# Patient Record
Sex: FEMALE | Race: WHITE | URBAN - METROPOLITAN AREA
[De-identification: names, ages, dates, MRNs, and addresses within clinical notes are randomized per-mention and may not be internally consistent; named-entity substitution may affect disease eponyms.]

---

## 2018-04-04 ENCOUNTER — OFFICE VISIT (OUTPATIENT)
Dept: PRIMARY CARE CLINIC | Age: 33
End: 2018-04-04

## 2018-04-04 VITALS
RESPIRATION RATE: 20 BRPM | HEART RATE: 96 BPM | OXYGEN SATURATION: 98 % | WEIGHT: 176.2 LBS | SYSTOLIC BLOOD PRESSURE: 118 MMHG | DIASTOLIC BLOOD PRESSURE: 66 MMHG | HEIGHT: 62 IN | TEMPERATURE: 98.3 F | BODY MASS INDEX: 32.42 KG/M2

## 2018-04-04 DIAGNOSIS — F11.10 HEROIN ABUSE (HCC): ICD-10-CM

## 2018-04-04 DIAGNOSIS — Z3A.20 20 WEEKS GESTATION OF PREGNANCY: Primary | ICD-10-CM

## 2018-04-04 PROCEDURE — 99203 OFFICE O/P NEW LOW 30 MIN: CPT | Performed by: NURSE PRACTITIONER

## 2018-04-04 ASSESSMENT — PATIENT HEALTH QUESTIONNAIRE - PHQ9
SUM OF ALL RESPONSES TO PHQ9 QUESTIONS 1 & 2: 0
1. LITTLE INTEREST OR PLEASURE IN DOING THINGS: 0
SUM OF ALL RESPONSES TO PHQ QUESTIONS 1-9: 0
2. FEELING DOWN, DEPRESSED OR HOPELESS: 0

## 2018-04-04 ASSESSMENT — ENCOUNTER SYMPTOMS
EYES NEGATIVE: 1
GASTROINTESTINAL NEGATIVE: 1

## 2023-02-06 ENCOUNTER — APPOINTMENT (OUTPATIENT)
Dept: CT IMAGING | Age: 38
DRG: 113 | End: 2023-02-06
Payer: COMMERCIAL

## 2023-02-06 ENCOUNTER — HOSPITAL ENCOUNTER (INPATIENT)
Age: 38
LOS: 1 days | Discharge: HOME | DRG: 113 | End: 2023-02-07
Attending: STUDENT IN AN ORGANIZED HEALTH CARE EDUCATION/TRAINING PROGRAM | Admitting: INTERNAL MEDICINE
Payer: COMMERCIAL

## 2023-02-06 ENCOUNTER — APPOINTMENT (OUTPATIENT)
Dept: GENERAL RADIOLOGY | Age: 38
DRG: 113 | End: 2023-02-06
Payer: COMMERCIAL

## 2023-02-06 DIAGNOSIS — J02.0 STREP PHARYNGITIS: Primary | ICD-10-CM

## 2023-02-06 DIAGNOSIS — J44.1 COPD EXACERBATION (HCC): ICD-10-CM

## 2023-02-06 DIAGNOSIS — J36 TONSILLAR ABSCESS: ICD-10-CM

## 2023-02-06 DIAGNOSIS — F17.200 SMOKER: ICD-10-CM

## 2023-02-06 PROBLEM — A41.9 SEPSIS (HCC): Status: ACTIVE | Noted: 2023-02-06

## 2023-02-06 LAB
A/G RATIO: 0.9 (ref 1.1–2.2)
ALBUMIN SERPL-MCNC: 3.9 G/DL (ref 3.4–5)
ALP BLD-CCNC: 91 U/L (ref 40–129)
ALT SERPL-CCNC: 23 U/L (ref 10–40)
ANION GAP SERPL CALCULATED.3IONS-SCNC: 11 MMOL/L (ref 3–16)
AST SERPL-CCNC: 11 U/L (ref 15–37)
BASE EXCESS VENOUS: 3.6 MMOL/L (ref -3–3)
BASOPHILS ABSOLUTE: 0.1 K/UL (ref 0–0.2)
BASOPHILS RELATIVE PERCENT: 0.8 %
BILIRUB SERPL-MCNC: 0.4 MG/DL (ref 0–1)
BUN BLDV-MCNC: 8 MG/DL (ref 7–20)
CALCIUM SERPL-MCNC: 9.3 MG/DL (ref 8.3–10.6)
CHLORIDE BLD-SCNC: 100 MMOL/L (ref 99–110)
CO2: 28 MMOL/L (ref 21–32)
CREAT SERPL-MCNC: 0.7 MG/DL (ref 0.6–1.1)
EOSINOPHILS ABSOLUTE: 0.2 K/UL (ref 0–0.6)
EOSINOPHILS RELATIVE PERCENT: 1.5 %
GFR SERPL CREATININE-BSD FRML MDRD: >60 ML/MIN/{1.73_M2}
GLUCOSE BLD-MCNC: 96 MG/DL (ref 70–99)
HCG QUALITATIVE: NEGATIVE
HCO3 VENOUS: 28.2 MMOL/L (ref 23–29)
HCT VFR BLD CALC: 41.7 % (ref 36–48)
HEMOGLOBIN: 13.9 G/DL (ref 12–16)
LACTIC ACID, SEPSIS: 1.1 MMOL/L (ref 0.4–1.9)
LYMPHOCYTES ABSOLUTE: 2.4 K/UL (ref 1–5.1)
LYMPHOCYTES RELATIVE PERCENT: 16.7 %
MCH RBC QN AUTO: 26.3 PG (ref 26–34)
MCHC RBC AUTO-ENTMCNC: 33.2 G/DL (ref 31–36)
MCV RBC AUTO: 79.1 FL (ref 80–100)
MONOCYTES ABSOLUTE: 1.1 K/UL (ref 0–1.3)
MONOCYTES RELATIVE PERCENT: 7.6 %
NEUTROPHILS ABSOLUTE: 10.7 K/UL (ref 1.7–7.7)
NEUTROPHILS RELATIVE PERCENT: 73.4 %
O2 SAT, VEN: 87 %
O2 THERAPY: ABNORMAL
PCO2, VEN: 44.3 MMHG (ref 40–50)
PDW BLD-RTO: 14.9 % (ref 12.4–15.4)
PH VENOUS: 7.41 (ref 7.35–7.45)
PLATELET # BLD: 297 K/UL (ref 135–450)
PMV BLD AUTO: 8.8 FL (ref 5–10.5)
PO2, VEN: 52.6 MMHG (ref 25–40)
POTASSIUM REFLEX MAGNESIUM: 4.1 MMOL/L (ref 3.5–5.1)
RAPID INFLUENZA  B AGN: NEGATIVE
RAPID INFLUENZA A AGN: NEGATIVE
RBC # BLD: 5.27 M/UL (ref 4–5.2)
S PYO AG THROAT QL: POSITIVE
SARS-COV-2, NAAT: NOT DETECTED
SODIUM BLD-SCNC: 139 MMOL/L (ref 136–145)
TCO2 CALC VENOUS: 30 MMOL/L
TOTAL PROTEIN: 8.1 G/DL (ref 6.4–8.2)
WBC # BLD: 14.6 K/UL (ref 4–11)

## 2023-02-06 PROCEDURE — 82803 BLOOD GASES ANY COMBINATION: CPT

## 2023-02-06 PROCEDURE — 1200000000 HC SEMI PRIVATE

## 2023-02-06 PROCEDURE — 87040 BLOOD CULTURE FOR BACTERIA: CPT

## 2023-02-06 PROCEDURE — 85025 COMPLETE CBC W/AUTO DIFF WBC: CPT

## 2023-02-06 PROCEDURE — 70491 CT SOFT TISSUE NECK W/DYE: CPT

## 2023-02-06 PROCEDURE — 6360000002 HC RX W HCPCS

## 2023-02-06 PROCEDURE — 96365 THER/PROPH/DIAG IV INF INIT: CPT

## 2023-02-06 PROCEDURE — 71046 X-RAY EXAM CHEST 2 VIEWS: CPT

## 2023-02-06 PROCEDURE — 6370000000 HC RX 637 (ALT 250 FOR IP): Performed by: NURSE PRACTITIONER

## 2023-02-06 PROCEDURE — 6370000000 HC RX 637 (ALT 250 FOR IP)

## 2023-02-06 PROCEDURE — 96361 HYDRATE IV INFUSION ADD-ON: CPT

## 2023-02-06 PROCEDURE — G0378 HOSPITAL OBSERVATION PER HR: HCPCS

## 2023-02-06 PROCEDURE — 87880 STREP A ASSAY W/OPTIC: CPT

## 2023-02-06 PROCEDURE — 83605 ASSAY OF LACTIC ACID: CPT

## 2023-02-06 PROCEDURE — 87635 SARS-COV-2 COVID-19 AMP PRB: CPT

## 2023-02-06 PROCEDURE — 2580000003 HC RX 258: Performed by: STUDENT IN AN ORGANIZED HEALTH CARE EDUCATION/TRAINING PROGRAM

## 2023-02-06 PROCEDURE — 99285 EMERGENCY DEPT VISIT HI MDM: CPT

## 2023-02-06 PROCEDURE — 87150 DNA/RNA AMPLIFIED PROBE: CPT

## 2023-02-06 PROCEDURE — 6360000004 HC RX CONTRAST MEDICATION: Performed by: STUDENT IN AN ORGANIZED HEALTH CARE EDUCATION/TRAINING PROGRAM

## 2023-02-06 PROCEDURE — 80053 COMPREHEN METABOLIC PANEL: CPT

## 2023-02-06 PROCEDURE — 96375 TX/PRO/DX INJ NEW DRUG ADDON: CPT

## 2023-02-06 PROCEDURE — 36415 COLL VENOUS BLD VENIPUNCTURE: CPT

## 2023-02-06 PROCEDURE — 84703 CHORIONIC GONADOTROPIN ASSAY: CPT

## 2023-02-06 PROCEDURE — 87804 INFLUENZA ASSAY W/OPTIC: CPT

## 2023-02-06 PROCEDURE — 2580000003 HC RX 258

## 2023-02-06 RX ORDER — ACETAMINOPHEN 500 MG
1000 TABLET ORAL ONCE
Status: COMPLETED | OUTPATIENT
Start: 2023-02-06 | End: 2023-02-06

## 2023-02-06 RX ORDER — IPRATROPIUM BROMIDE AND ALBUTEROL SULFATE 2.5; .5 MG/3ML; MG/3ML
1 SOLUTION RESPIRATORY (INHALATION) EVERY 4 HOURS PRN
Status: DISCONTINUED | OUTPATIENT
Start: 2023-02-06 | End: 2023-02-07 | Stop reason: HOSPADM

## 2023-02-06 RX ORDER — PREDNISONE 20 MG/1
60 TABLET ORAL ONCE
Status: COMPLETED | OUTPATIENT
Start: 2023-02-06 | End: 2023-02-06

## 2023-02-06 RX ORDER — IPRATROPIUM BROMIDE AND ALBUTEROL SULFATE 2.5; .5 MG/3ML; MG/3ML
1 SOLUTION RESPIRATORY (INHALATION) ONCE
Status: COMPLETED | OUTPATIENT
Start: 2023-02-06 | End: 2023-02-06

## 2023-02-06 RX ORDER — METHADONE HYDROCHLORIDE 10 MG/5ML
98 SOLUTION ORAL DAILY
COMMUNITY

## 2023-02-06 RX ORDER — DEXAMETHASONE SODIUM PHOSPHATE 4 MG/ML
10 INJECTION, SOLUTION INTRA-ARTICULAR; INTRALESIONAL; INTRAMUSCULAR; INTRAVENOUS; SOFT TISSUE ONCE
Status: COMPLETED | OUTPATIENT
Start: 2023-02-06 | End: 2023-02-06

## 2023-02-06 RX ORDER — SODIUM CHLORIDE 9 MG/ML
INJECTION, SOLUTION INTRAVENOUS PRN
Status: DISCONTINUED | OUTPATIENT
Start: 2023-02-06 | End: 2023-02-07 | Stop reason: HOSPADM

## 2023-02-06 RX ORDER — SODIUM CHLORIDE 0.9 % (FLUSH) 0.9 %
5-40 SYRINGE (ML) INJECTION PRN
Status: DISCONTINUED | OUTPATIENT
Start: 2023-02-06 | End: 2023-02-07 | Stop reason: HOSPADM

## 2023-02-06 RX ORDER — ALBUTEROL SULFATE 2.5 MG/3ML
5 SOLUTION RESPIRATORY (INHALATION) ONCE
Status: COMPLETED | OUTPATIENT
Start: 2023-02-06 | End: 2023-02-06

## 2023-02-06 RX ORDER — ONDANSETRON 2 MG/ML
4 INJECTION INTRAMUSCULAR; INTRAVENOUS EVERY 6 HOURS PRN
Status: DISCONTINUED | OUTPATIENT
Start: 2023-02-06 | End: 2023-02-07 | Stop reason: HOSPADM

## 2023-02-06 RX ORDER — SODIUM CHLORIDE 9 MG/ML
INJECTION, SOLUTION INTRAVENOUS CONTINUOUS
Status: DISCONTINUED | OUTPATIENT
Start: 2023-02-06 | End: 2023-02-07 | Stop reason: HOSPADM

## 2023-02-06 RX ORDER — SODIUM CHLORIDE, SODIUM LACTATE, POTASSIUM CHLORIDE, AND CALCIUM CHLORIDE .6; .31; .03; .02 G/100ML; G/100ML; G/100ML; G/100ML
1000 INJECTION, SOLUTION INTRAVENOUS ONCE
Status: COMPLETED | OUTPATIENT
Start: 2023-02-06 | End: 2023-02-06

## 2023-02-06 RX ORDER — ENOXAPARIN SODIUM 100 MG/ML
30 INJECTION SUBCUTANEOUS 2 TIMES DAILY
Status: DISCONTINUED | OUTPATIENT
Start: 2023-02-07 | End: 2023-02-07 | Stop reason: HOSPADM

## 2023-02-06 RX ORDER — ACETAMINOPHEN 650 MG/1
650 SUPPOSITORY RECTAL EVERY 6 HOURS PRN
Status: DISCONTINUED | OUTPATIENT
Start: 2023-02-06 | End: 2023-02-07 | Stop reason: HOSPADM

## 2023-02-06 RX ORDER — DEXAMETHASONE SODIUM PHOSPHATE 10 MG/ML
6 INJECTION, SOLUTION INTRAMUSCULAR; INTRAVENOUS EVERY 8 HOURS
Status: DISCONTINUED | OUTPATIENT
Start: 2023-02-07 | End: 2023-02-07 | Stop reason: HOSPADM

## 2023-02-06 RX ORDER — SODIUM CHLORIDE 0.9 % (FLUSH) 0.9 %
5-40 SYRINGE (ML) INJECTION EVERY 12 HOURS SCHEDULED
Status: DISCONTINUED | OUTPATIENT
Start: 2023-02-06 | End: 2023-02-07 | Stop reason: HOSPADM

## 2023-02-06 RX ORDER — QUETIAPINE FUMARATE 300 MG/1
300 TABLET, FILM COATED ORAL NIGHTLY
COMMUNITY

## 2023-02-06 RX ORDER — ACETAMINOPHEN 325 MG/1
650 TABLET ORAL EVERY 6 HOURS PRN
Status: DISCONTINUED | OUTPATIENT
Start: 2023-02-06 | End: 2023-02-07 | Stop reason: HOSPADM

## 2023-02-06 RX ORDER — QUETIAPINE FUMARATE 200 MG/1
200 TABLET, FILM COATED ORAL NIGHTLY
Status: DISCONTINUED | OUTPATIENT
Start: 2023-02-06 | End: 2023-02-07 | Stop reason: HOSPADM

## 2023-02-06 RX ADMIN — AMPICILLIN SODIUM AND SULBACTAM SODIUM 3000 MG: 2; 1 INJECTION, POWDER, FOR SOLUTION INTRAMUSCULAR; INTRAVENOUS at 19:32

## 2023-02-06 RX ADMIN — QUETIAPINE FUMARATE 200 MG: 200 TABLET ORAL at 23:24

## 2023-02-06 RX ADMIN — Medication 10 ML: at 23:36

## 2023-02-06 RX ADMIN — DEXAMETHASONE SODIUM PHOSPHATE 10 MG: 4 INJECTION, SOLUTION INTRAMUSCULAR; INTRAVENOUS at 20:08

## 2023-02-06 RX ADMIN — IOPAMIDOL 100 ML: 755 INJECTION, SOLUTION INTRAVENOUS at 17:21

## 2023-02-06 RX ADMIN — ACETAMINOPHEN 1000 MG: 500 TABLET ORAL at 16:25

## 2023-02-06 RX ADMIN — SODIUM CHLORIDE: 9 INJECTION, SOLUTION INTRAVENOUS at 23:35

## 2023-02-06 RX ADMIN — ALBUTEROL SULFATE 5 MG: 2.5 SOLUTION RESPIRATORY (INHALATION) at 16:13

## 2023-02-06 RX ADMIN — IPRATROPIUM BROMIDE AND ALBUTEROL SULFATE 1 AMPULE: .5; 3 SOLUTION RESPIRATORY (INHALATION) at 16:13

## 2023-02-06 RX ADMIN — PREDNISONE 60 MG: 20 TABLET ORAL at 16:26

## 2023-02-06 RX ADMIN — SODIUM CHLORIDE, POTASSIUM CHLORIDE, SODIUM LACTATE AND CALCIUM CHLORIDE 1000 ML: 600; 310; 30; 20 INJECTION, SOLUTION INTRAVENOUS at 16:26

## 2023-02-06 ASSESSMENT — PAIN SCALES - GENERAL
PAINLEVEL_OUTOF10: 7
PAINLEVEL_OUTOF10: 10
PAINLEVEL_OUTOF10: 10
PAINLEVEL_OUTOF10: 8
PAINLEVEL_OUTOF10: 10
PAINLEVEL_OUTOF10: 6
PAINLEVEL_OUTOF10: 10
PAINLEVEL_OUTOF10: 9

## 2023-02-06 ASSESSMENT — ENCOUNTER SYMPTOMS
FACIAL SWELLING: 1
TROUBLE SWALLOWING: 0
EYE PAIN: 0
COUGH: 0
WHEEZING: 1
SINUS PAIN: 0
SHORTNESS OF BREATH: 0
ABDOMINAL PAIN: 0
STRIDOR: 0
RHINORRHEA: 0
DIARRHEA: 0
NAUSEA: 0
VOICE CHANGE: 1
CONSTIPATION: 0
VOMITING: 0
SORE THROAT: 1
BACK PAIN: 0

## 2023-02-06 ASSESSMENT — PAIN DESCRIPTION - LOCATION
LOCATION: THROAT

## 2023-02-06 ASSESSMENT — PAIN DESCRIPTION - PAIN TYPE
TYPE: ACUTE PAIN
TYPE: ACUTE PAIN

## 2023-02-06 ASSESSMENT — PAIN DESCRIPTION - FREQUENCY: FREQUENCY: CONTINUOUS

## 2023-02-06 ASSESSMENT — PAIN DESCRIPTION - ORIENTATION
ORIENTATION: RIGHT
ORIENTATION: RIGHT

## 2023-02-06 ASSESSMENT — PAIN DESCRIPTION - DESCRIPTORS
DESCRIPTORS: SORE
DESCRIPTORS: ACHING
DESCRIPTORS: ACHING;SORE
DESCRIPTORS: SORE

## 2023-02-06 ASSESSMENT — PAIN DESCRIPTION - ONSET: ONSET: ON-GOING

## 2023-02-06 ASSESSMENT — PAIN - FUNCTIONAL ASSESSMENT: PAIN_FUNCTIONAL_ASSESSMENT: 0-10

## 2023-02-06 NOTE — ED PROVIDER NOTES
[facility] ENCOUNTER        Pt Name: Say Scruggs  MRN: [id number]  [place] 1985  Date of evaluation: 2/6/2023  Provider: NAVDEEP Sanches CNP  PCP: No primary care provider on file. Note Started: 3:09 PM EST 2/6/23       I have seen and evaluated this patient with my supervising physician Damari Segura MD.      CHIEF COMPLAINT       Chief Complaint   Patient presents with    Pharyngitis     Difficulty swallowing       HISTORY OF PRESENT ILLNESS: 1 or more Elements     History From: pt      Say Scruggs is a 40 y.o. female who presents to the ED with concern for 4 days of sore throat. Worse in the right side. No medications taken other than a benny honey makes for symptom management. Having a hard time swallowing and concerned about voice changes and came to ED. Not vaccinated against COVID or flu. Smoking every day. History of asthma. Not using inhaler at home at this time    Nursing Notes were all reviewed and agreed with or any disagreements were addressed in the HPI. REVIEW OF SYSTEMS :      Review of Systems   Constitutional:  Positive for activity change, chills and fatigue. Negative for diaphoresis and fever. HENT:  Positive for facial swelling, sore throat and voice change. Negative for congestion, drooling, rhinorrhea, sinus pain and trouble swallowing. Eyes:  Negative for pain and visual disturbance. Respiratory:  Positive for wheezing. Negative for cough, shortness of breath and stridor. Cardiovascular:  Negative for chest pain and leg swelling. Gastrointestinal:  Negative for abdominal pain, constipation, diarrhea, nausea and vomiting. Genitourinary:  Negative for frequency and hematuria. Musculoskeletal:  Negative for back pain and neck pain. Skin:  Negative for rash and wound. Neurological:  Negative for dizziness and light-headedness. Positives and Pertinent negatives as per HPI. SURGICAL HISTORY     Past Surgical History:   Procedure Laterality Date    ECTOPIC PREGNANCY SURGERY      with tubal removal       CURRENTMEDICATIONS       Previous Medications    ALBUTEROL SULFATE HFA (PROAIR HFA) 108 (90 BASE) MCG/ACT INHALER    Use every 4 hours while awake for 7-10 days then PRN wheezing  Dispense with SPACER and Instruct on use. May sub Ventolin or Proventil as needed per [employer]. ALLERGIES     Patient has no known allergies. FAMILYHISTORY       Family History   Problem Relation Age of Onset    Asthma Other     Asthma Mother     Cancer Mother     Cancer Maternal Aunt     High Blood Pressure Maternal Uncle     Learning Disabilities Maternal Uncle     Asthma Maternal Grandmother     Cancer Maternal Grandmother         SOCIAL HISTORY       Social History     Tobacco Use    Smoking status: Every Day     Packs/day: 1.50     Years: 13.00     Pack years: 19.50     Types: Cigarettes    Smokeless tobacco: Never   Substance Use Topics    Alcohol use: No    Drug use: Yes     Types: IV     Comment: h/o heroin abuse       SCREENINGS        Loan Coma Scale  Eye Opening: Spontaneous  Best Verbal Response: Oriented  Best Motor Response: Obeys commands  Moore Coma Scale Score: 15                CIWA Assessment  BP: 132/78  Heart Rate: 100           PHYSICAL EXAM  1 or more Elements     ED Triage Vitals [02/06/23 1458]   BP Temp Temp Source Heart Rate Resp SpO2 Height Weight   124/75 99 °F (37.2 °C) Oral 81 16 93 % 5' 3\" (1.6 m) 265 lb 3.4 oz (120.3 kg)       Physical Exam  Vitals and nursing note reviewed. Constitutional:       Appearance: Normal appearance. She is obese. She is ill-appearing. She is not toxic-appearing or diaphoretic. HENT:      Head: Normocephalic and atraumatic.       Comments: Voice muffled     Right Ear: Tympanic membrane, ear canal and external ear normal.      Left Ear: Tympanic membrane, ear canal and external ear normal.      Nose: Nose normal. No congestion or rhinorrhea. Mouth/Throat:      Mouth: Mucous membranes are moist.      Pharynx: Oropharynx is clear. No oropharyngeal exudate or posterior oropharyngeal erythema. Tonsils: Tonsillar exudate and tonsillar abscess present. 4+ on the right. 4+ on the left. Eyes:      General: No scleral icterus. Right eye: No discharge. Left eye: No discharge. Extraocular Movements: Extraocular movements intact. Right eye: Normal extraocular motion. Conjunctiva/sclera: Conjunctivae normal.      Pupils: Pupils are equal, round, and reactive to light. Cardiovascular:      Rate and Rhythm: Normal rate and regular rhythm. Pulses: Normal pulses. Heart sounds: Normal heart sounds. No murmur heard. No friction rub. No gallop. Pulmonary:      Effort: Pulmonary effort is normal. No respiratory distress. Breath sounds: No stridor. Wheezing present. No rhonchi or rales. Abdominal:      General: Abdomen is flat. Bowel sounds are normal. There is no distension. Palpations: Abdomen is soft. Tenderness: There is no abdominal tenderness. There is no right CVA tenderness, left CVA tenderness or guarding. Musculoskeletal:         General: Normal range of motion. Cervical back: Normal range of motion and neck supple. No rigidity or tenderness. Lymphadenopathy:      Cervical: Cervical adenopathy present. Skin:     General: Skin is warm and dry. Capillary Refill: Capillary refill takes less than 2 seconds. Coloration: Skin is not jaundiced or pale. Findings: No rash. Neurological:      General: No focal deficit present. Mental Status: She is alert and oriented to person, place, and time. Mental status is at baseline.    Psychiatric:         Mood and Affect: Mood normal.         Behavior: Behavior normal.         DIAGNOSTIC RESULTS   LABS:    Labs Reviewed   STREP SCREEN GROUP A THROAT - Abnormal; Notable for the following components:       Result Value    Rapid Strep A Screen POSITIVE (*)     All other components within normal limits   COMPREHENSIVE METABOLIC PANEL W/ REFLEX TO MG FOR LOW K - Abnormal; Notable for the following components:    Albumin/Globulin Ratio 0.9 (*)     AST 11 (*)     All other components within normal limits   CBC WITH AUTO DIFFERENTIAL - Abnormal; Notable for the following components:    WBC 14.6 (*)     RBC 5.27 (*)     MCV 79.1 (*)     Neutrophils Absolute 10.7 (*)     All other components within normal limits   BLOOD GAS, VENOUS - Abnormal; Notable for the following components:    pO2, Woo 52.6 (*)     Base Excess, Woo 3.6 (*)     All other components within normal limits   RAPID INFLUENZA A/B ANTIGENS   COVID-19, RAPID   CULTURE, BLOOD 2   CULTURE, BLOOD 1   HCG, SERUM, QUALITATIVE    Narrative:     XGRY TUBE NOT ON ICE   LACTATE, SEPSIS   LACTATE, SEPSIS       When ordered only abnormal lab results are displayed. All other labs were within normal range or not returned as of this dictation. EKG: When ordered, EKG's are interpreted by the Emergency Department Physician in the absence of a cardiologist.  Please see their note for interpretation of EKG. RADIOLOGY:   Non-plain film images such as CT, Ultrasound and MRI are read by the radiologist. Plain radiographic images are visualized and preliminarily interpreted by the ED Provider with the below findings:        Interpretation per the Radiologist below, if available at the time of this note:    CT SOFT TISSUE NECK W CONTRAST   Preliminary Result   33 mm right palatine tonsil abscess. XR CHEST (2 VW)   Final Result   Subjective findings consistent with central bronchitis. Otherwise,   radiographically nonacute chest.           No results found. No results found.     PROCEDURES   Unless otherwise noted below, none     Procedures    CRITICAL CARE TIME (.cctime)   Elba DECKER CNP, am the primary clinician of record  I provided 30 minutes of non concurrent Critical Care time, excluding separately reportable procedures. There was a high probability of clinically significant/life threatening deterioration in the patient's condition which required my urgent intervention. This time spent assessing, reassessing patient, chart review and discussing case with other providers      PAST MEDICAL HISTORY      has a past medical history of Abuse, drug or alcohol ([facility] [address] Asthma, Asthma exacerbation attacks (1/16/2014), Bipolar 1 disorder ([facility] [address] Cancer ([facility] [address] (cervical), Depression, and Liver disease (hep C). EMERGENCY DEPARTMENT COURSE and DIFFERENTIAL DIAGNOSIS/MDM:   Vitals:    Vitals:    02/06/23 1458 02/06/23 1545 02/06/23 1625 02/06/23 1915   BP: 124/75 128/75 132/78    Pulse: 81   100   Resp: 16      Temp: 99 °F (37.2 °C)  100.1 °F (37.8 °C) 100.2 °F (37.9 °C)   TempSrc: Oral  Oral Oral   SpO2: 93% 98%     Weight: 265 lb 3.4 oz (120.3 kg)      Height: 5' 3\" (1.6 m)          Patient was given the following medications:  Medications   ampicillin-sulbactam (UNASYN) 3,000 mg in sodium chloride 0.9 % 100 mL IVPB (mini-bag) (3,000 mg IntraVENous New Bag 2/6/23 1932)   Dexamethasone Sodium Phosphate injection 10 mg (has no administration in time range)   lactated ringers bolus (0 mLs IntraVENous Stopped 2/6/23 1918)   acetaminophen (TYLENOL) tablet 1,000 mg (1,000 mg Oral Given 2/6/23 1625)   albuterol (PROVENTIL) nebulizer solution 5 mg (5 mg Nebulization Given 2/6/23 1613)   ipratropium-albuterol (DUONEB) nebulizer solution 1 ampule (1 ampule Inhalation Given 2/6/23 1613)   predniSONE (DELTASONE) tablet 60 mg (60 mg Oral Given 2/6/23 1626)   iopamidol (ISOVUE-370) 76 % injection 100 mL (100 mLs IntraVENous Given 2/6/23 1721)       ED Course as of 02/06/23 1939 Mon Feb 06, 2023 1806 Talked with Dr Tigist Martha ENT.  Admit to hospitalist. NPO mn. Dexamethasone and unasyn q 8 hours [KM]      ED Course User Index  [KM] Vito Landeros, APRN - CNP        Is this patient to be included in the SEP-1 Core Measure due to severe sepsis or septic shock? No   Exclusion criteria - the patient is NOT to be included for SEP-1 Core Measure due to:  2+ SIRS criteria are not met    Chronic Conditions affecting care:    has a past medical history of Abuse, drug or alcohol ([facility] [address] Asthma, Asthma exacerbation attacks (1/16/2014), Bipolar 1 disorder ([facility] [address] Cancer ([facility] [address] (cervical), Depression, and Liver disease (hep C). CONSULTS: (Who and What was discussed)  IP CONSULT TO OTOLARYNGOLOGY  IP CONSULT TO OTOLARYNGOLOGY  Findings were discussed with Dr. Faustina Love from ENT. Presentation and vitals also discussed. Would like to start patient on antibiotics, steroids every 8 hours. N.p.o. midnight, continuous pulse ox and admit to medicine. Findings communicated to Dr. Guille Gupta from the medicine team    Social Determinants Significantly Affecting Health : Patient has / had difficulty affording medications     Records Reviewed (External and Source)     CC/HPI Summary, DDx, ED Course, and Reassessment:   Differential diagnoses: Airway Obstruction, Epiglottitis, Retropharyngeal Abscess, Parapharyngeal Abscess, Pneumonia, Hypoxemia, Dehydration, other. 60-year-old presenting to ED with concern for worsening sore throat with extreme tenderness on the right side. She is concerned about her wheezing. She was seen at outside hospital ED for COPD exacerbation earlier in January  Based on her voice change and presentation a broad work-up was started including lactate, blood cultures and imaging. Labs:  Lactate 1.1. VBG without evidence of acidosis  Pregnancy test is negative. CMP without gross electro derangement, renal function abnormality or liver function abnormality. Rapid COVID and flu is negative. Rapid strep is positive. CT soft tissue of the neck is obtained. Concerning for a large abscess in the right tonsil as above.     The patient is already been given breathing treatments and oral steroids for management of her likely COPD exacerbation. She will also be started on Unasyn and given a dose of dexamethasone. I talked with the ENT on-call as above. The plan is for the patient to be medicated every 8 hours, admitted for close observation to [facility] with likely intervention in the morning. Plan and findings were discussed with the patient. She is agreeable. We also discussed smoking cessation for roughly 3 minutes. Goals of care discussed the patient    Disposition Considerations (tests considered but not done, Admit vs D/C, Shared Decision Making, Pt Expectation of Test or Tx.):        I am the Primary Clinician of Record. FINAL IMPRESSION      1. Strep pharyngitis    2. Tonsillar abscess    3. Smoker    4. COPD exacerbation ([facility] [address]          DISPOSITION/PLAN     DISPOSITION Decision To Admit 02/06/2023 05:56:31 PM      PATIENT REFERRED TO:  No follow-up provider specified.     DISCHARGE MEDICATIONS:  New Prescriptions    No medications on file       DISCONTINUED MEDICATIONS:  Discontinued Medications    No medications on file              (Please note that portions of this note were completed with a voice recognition program.  Efforts were made to edit the dictations but occasionally words are mis-transcribed.)    NAVDEEP Truong CNP (electronically signed)        NAVDEEP Truong CNP  02/06/23 1939

## 2023-02-07 ENCOUNTER — OFFICE VISIT (OUTPATIENT)
Dept: ENT CLINIC | Age: 38
End: 2023-02-07
Payer: COMMERCIAL

## 2023-02-07 VITALS
RESPIRATION RATE: 16 BRPM | BODY MASS INDEX: 44.15 KG/M2 | WEIGHT: 265 LBS | DIASTOLIC BLOOD PRESSURE: 91 MMHG | HEIGHT: 65 IN | SYSTOLIC BLOOD PRESSURE: 173 MMHG | HEART RATE: 118 BPM

## 2023-02-07 VITALS
SYSTOLIC BLOOD PRESSURE: 127 MMHG | OXYGEN SATURATION: 94 % | TEMPERATURE: 97.5 F | RESPIRATION RATE: 16 BRPM | HEIGHT: 65 IN | BODY MASS INDEX: 44.19 KG/M2 | HEART RATE: 81 BPM | DIASTOLIC BLOOD PRESSURE: 86 MMHG | WEIGHT: 265.21 LBS

## 2023-02-07 DIAGNOSIS — R13.10 ODYNOPHAGIA: Primary | ICD-10-CM

## 2023-02-07 DIAGNOSIS — J36 PERITONSILLAR ABSCESS: ICD-10-CM

## 2023-02-07 LAB — REPORT: NORMAL

## 2023-02-07 PROCEDURE — 96376 TX/PRO/DX INJ SAME DRUG ADON: CPT

## 2023-02-07 PROCEDURE — G0378 HOSPITAL OBSERVATION PER HR: HCPCS

## 2023-02-07 PROCEDURE — 96372 THER/PROPH/DIAG INJ SC/IM: CPT

## 2023-02-07 PROCEDURE — 99204 OFFICE O/P NEW MOD 45 MIN: CPT | Performed by: STUDENT IN AN ORGANIZED HEALTH CARE EDUCATION/TRAINING PROGRAM

## 2023-02-07 PROCEDURE — G8484 FLU IMMUNIZE NO ADMIN: HCPCS | Performed by: STUDENT IN AN ORGANIZED HEALTH CARE EDUCATION/TRAINING PROGRAM

## 2023-02-07 PROCEDURE — 6370000000 HC RX 637 (ALT 250 FOR IP): Performed by: NURSE PRACTITIONER

## 2023-02-07 PROCEDURE — G8417 CALC BMI ABV UP PARAM F/U: HCPCS | Performed by: STUDENT IN AN ORGANIZED HEALTH CARE EDUCATION/TRAINING PROGRAM

## 2023-02-07 PROCEDURE — 6360000002 HC RX W HCPCS: Performed by: STUDENT IN AN ORGANIZED HEALTH CARE EDUCATION/TRAINING PROGRAM

## 2023-02-07 PROCEDURE — 6370000000 HC RX 637 (ALT 250 FOR IP): Performed by: INTERNAL MEDICINE

## 2023-02-07 PROCEDURE — 2580000003 HC RX 258: Performed by: STUDENT IN AN ORGANIZED HEALTH CARE EDUCATION/TRAINING PROGRAM

## 2023-02-07 PROCEDURE — 42700 I&D ABSCESS PERITONSILLAR: CPT | Performed by: STUDENT IN AN ORGANIZED HEALTH CARE EDUCATION/TRAINING PROGRAM

## 2023-02-07 PROCEDURE — G8427 DOCREV CUR MEDS BY ELIG CLIN: HCPCS | Performed by: STUDENT IN AN ORGANIZED HEALTH CARE EDUCATION/TRAINING PROGRAM

## 2023-02-07 PROCEDURE — 96366 THER/PROPH/DIAG IV INF ADDON: CPT

## 2023-02-07 RX ORDER — AMOXICILLIN AND CLAVULANATE POTASSIUM 875; 125 MG/1; MG/1
1 TABLET, FILM COATED ORAL 2 TIMES DAILY
Qty: 24 TABLET | Refills: 0 | Status: SHIPPED | OUTPATIENT
Start: 2023-02-07 | End: 2023-02-07 | Stop reason: HOSPADM

## 2023-02-07 RX ORDER — ALBUTEROL SULFATE 90 UG/1
2 AEROSOL, METERED RESPIRATORY (INHALATION) EVERY 6 HOURS PRN
Status: DISCONTINUED | OUTPATIENT
Start: 2023-02-07 | End: 2023-02-07 | Stop reason: HOSPADM

## 2023-02-07 RX ORDER — ALBUTEROL SULFATE 90 UG/1
2 AEROSOL, METERED RESPIRATORY (INHALATION) EVERY 4 HOURS PRN
Qty: 1 EACH | Refills: 3 | Status: SHIPPED | OUTPATIENT
Start: 2023-02-07

## 2023-02-07 RX ORDER — BUDESONIDE AND FORMOTEROL FUMARATE DIHYDRATE 160; 4.5 UG/1; UG/1
2 AEROSOL RESPIRATORY (INHALATION) 2 TIMES DAILY
Qty: 30.6 G | Refills: 1 | Status: SHIPPED | OUTPATIENT
Start: 2023-02-07

## 2023-02-07 RX ORDER — KETOROLAC TROMETHAMINE 30 MG/ML
30 INJECTION, SOLUTION INTRAMUSCULAR; INTRAVENOUS EVERY 6 HOURS PRN
Status: DISCONTINUED | OUTPATIENT
Start: 2023-02-07 | End: 2023-02-07 | Stop reason: HOSPADM

## 2023-02-07 RX ORDER — AMOXICILLIN AND CLAVULANATE POTASSIUM 875; 125 MG/1; MG/1
1 TABLET, FILM COATED ORAL 2 TIMES DAILY
Qty: 20 TABLET | Refills: 0 | Status: SHIPPED | OUTPATIENT
Start: 2023-02-07 | End: 2023-02-17

## 2023-02-07 RX ORDER — METHADONE HYDROCHLORIDE 10 MG/1
96 TABLET ORAL DAILY
Status: DISCONTINUED | OUTPATIENT
Start: 2023-02-07 | End: 2023-02-07 | Stop reason: HOSPADM

## 2023-02-07 RX ORDER — IBUPROFEN 800 MG/1
800 TABLET ORAL EVERY 8 HOURS PRN
Qty: 30 TABLET | Refills: 0 | Status: SHIPPED | OUTPATIENT
Start: 2023-02-07

## 2023-02-07 RX ORDER — IPRATROPIUM BROMIDE AND ALBUTEROL SULFATE 2.5; .5 MG/3ML; MG/3ML
3 SOLUTION RESPIRATORY (INHALATION) EVERY 4 HOURS PRN
Qty: 360 ML | Refills: 3 | Status: SHIPPED | OUTPATIENT
Start: 2023-02-07

## 2023-02-07 RX ORDER — MONTELUKAST SODIUM 10 MG/1
10 TABLET ORAL NIGHTLY
Qty: 30 TABLET | Refills: 3 | Status: SHIPPED | OUTPATIENT
Start: 2023-02-07

## 2023-02-07 RX ORDER — CEFUROXIME AXETIL 500 MG/1
500 TABLET ORAL 2 TIMES DAILY
Qty: 24 TABLET | Refills: 0 | Status: SHIPPED | OUTPATIENT
Start: 2023-02-07 | End: 2023-02-07 | Stop reason: ALTCHOICE

## 2023-02-07 RX ORDER — NICOTINE 21 MG/24HR
1 PATCH, TRANSDERMAL 24 HOURS TRANSDERMAL DAILY
Status: DISCONTINUED | OUTPATIENT
Start: 2023-02-07 | End: 2023-02-07 | Stop reason: HOSPADM

## 2023-02-07 RX ORDER — NICOTINE 21 MG/24HR
1 PATCH, TRANSDERMAL 24 HOURS TRANSDERMAL DAILY
Qty: 14 PATCH | Refills: 0 | Status: SHIPPED | OUTPATIENT
Start: 2023-02-07

## 2023-02-07 RX ORDER — PREDNISONE 20 MG/1
40 TABLET ORAL DAILY
Qty: 10 TABLET | Refills: 0 | Status: SHIPPED | OUTPATIENT
Start: 2023-02-07 | End: 2023-02-12

## 2023-02-07 RX ORDER — MORPHINE SULFATE 4 MG/ML
4 INJECTION, SOLUTION INTRAMUSCULAR; INTRAVENOUS EVERY 4 HOURS PRN
Status: DISCONTINUED | OUTPATIENT
Start: 2023-02-07 | End: 2023-02-07 | Stop reason: HOSPADM

## 2023-02-07 RX ORDER — NAPROXEN 250 MG/1
250 TABLET ORAL 2 TIMES DAILY WITH MEALS
Qty: 180 TABLET | Refills: 1 | Status: SHIPPED | OUTPATIENT
Start: 2023-02-07 | End: 2023-02-17

## 2023-02-07 RX ADMIN — METHADONE HYDROCHLORIDE 95 MG: 10 TABLET ORAL at 09:16

## 2023-02-07 RX ADMIN — AMPICILLIN SODIUM AND SULBACTAM SODIUM 3000 MG: 2; 1 INJECTION, POWDER, FOR SOLUTION INTRAMUSCULAR; INTRAVENOUS at 02:48

## 2023-02-07 RX ADMIN — AMPICILLIN SODIUM AND SULBACTAM SODIUM 3000 MG: 2; 1 INJECTION, POWDER, FOR SOLUTION INTRAMUSCULAR; INTRAVENOUS at 09:27

## 2023-02-07 RX ADMIN — ENOXAPARIN SODIUM 30 MG: 30 INJECTION SUBCUTANEOUS at 10:51

## 2023-02-07 RX ADMIN — DEXAMETHASONE SODIUM PHOSPHATE 6 MG: 10 INJECTION, SOLUTION INTRAMUSCULAR; INTRAVENOUS at 05:56

## 2023-02-07 RX ADMIN — Medication 10 ML: at 09:20

## 2023-02-07 NOTE — PROGRESS NOTES
[facility] Medicine Progress Note     Date:  2/7/2023    PCP: No primary care provider on file. (Tel: None)    Date of Admission: 2/6/2023    Chief complaint:   Chief Complaint   Patient presents with    Pharyngitis     Difficulty swallowing  the right side of throat is sore  feels like she cant talk  Able to swalllow saliva       Brief admission history: 81-year-old female with history of asthma, IV drug use, chronic methadone use, bipolar disorder, depression, chronic hepatitis C, morbid obesity with BMI of 44 kg/m², tobacco use disorder, HPV cancer, who presents to the emergency room with complaints of sore throat that been ongoing for about 4 days prior to ER visit. She also reported muffled voice. She reports noncompliance with breathing treatments, noted to be wheezing in the emergency room. She had leukocytosis of 14,600. CT-soft tissue neck revealed 33 mm right palatine tonsil abscess. ENT consulted from the emergency room. She was started on Unasyn and steroid. Assessment/plan:  Tonsillar abscess. She is currently on unasyn, steroid. She has been on intravenous fluid. ENT planning drainage in office at 11 AM today, after which they said patient is able to discharge. Sent prescription to pharmacy for cefuroxime. History of asthma, noncompliance with medication use. She has some wheezes, which she reports to be \"baseline. \" Will discharge with a prescription for prednisone and breathing treatments. IV drug use, tobacco use. Counseled about cessation of illicit drug use. Counseled about cessation of tobacco use. NicoDerm patch ordered. Other comorbidities: history of asthma, IV drug use, chronic methadone use, bipolar disorder, depression, chronic hepatitis C, morbid obesity with BMI of 44 kg/m², tobacco use disorder, HPV cancer. Discharge once cleared from ENT standpoint. Diet: Diet NPO Exceptions are: Ice Chips    Code status: Full Code   ----------  Subjective  Feels better. Wants to go home. Objective  Physical exam:  Vitals: BP (!) 150/87   Pulse 90   Temp 97.5 °F (36.4 °C) (Oral)   Resp 20   Ht 5' 5\" (1.651 m)   Wt 265 lb 3.4 oz (120.3 kg)   SpO2 95%   BMI 44.13 kg/m²   Gen/overall appearance: Not in acute distress. Alert. Oriented x3. Head: Normocephalic, atraumatic  Eyes: EOMI, good acuity  ENT: Oral mucosa moist  Neck: No JVD, thyromegaly  CVS: Nml S1S2, no MRG, RRR  Pulm: Faint expiratory wheezes present. No accessory muscle use. Gastrointestinal: Soft, NT/ND, +BS  Musculoskeletal: No edema. Warm  Neuro: No focal deficit. Moves extremity spontaneously. Psychiatry: Appropriate affect. Not agitated. Skin: Warm, dry with normal turgor. No rash  Capillary refill: Brisk,< 3 seconds   Peripheral Pulses: +2 palpable, equal bilaterally      24HR INTAKE/OUTPUT:    Intake/Output Summary (Last 24 hours) at 2/7/2023 7263  Last data filed at 2/6/2023 2006  Gross per 24 hour   Intake 100 ml   Output --   Net 100 ml     I/O last 3 completed shifts: In: 100 [IV Piggyback:100]  Out: -   No intake/output data recorded.   Meds:    methadone  95 mg Oral Daily    sodium chloride flush  5-40 mL IntraVENous 2 times per day    enoxaparin  30 mg SubCUTAneous BID    dexamethasone  6 mg IntraVENous Q8H    ampicillin-sulbactam  3,000 mg IntraVENous Q6H    QUEtiapine  200 mg Oral Nightly     Infusions:    sodium chloride      sodium chloride 100 mL/hr at 02/06/23 2335     PRN Meds: albuterol sulfate HFA, morphine, sodium chloride flush, sodium chloride, acetaminophen **OR** acetaminophen, ondansetron, ipratropium-albuterol    Labs/imaging:  CBC:   Recent Labs     02/06/23  1600   WBC 14.6*   HGB 13.9        BMP:    Recent Labs     02/06/23  1600      K 4.1      CO2 28   BUN 8   CREATININE 0.7   GLUCOSE 96     Hepatic:   Recent Labs     02/06/23  1600   AST 11*   ALT 23   BILITOT 0.4   ALKPHOS 91       Yves Dia MD  -------------------------------  Graciela hospitalist

## 2023-02-07 NOTE — ED NOTES
States she is feeling better  Able to talk much clearer  Able to swallow pills     Arleen Jackson RN  02/06/23 1992

## 2023-02-07 NOTE — PLAN OF CARE
Problem: Discharge Planning  Goal: Discharge to home or other facility with appropriate resources  Outcome: Progressing  Flowsheets (Taken 2/6/2023 5904)  Discharge to home or other facility with appropriate resources:   Identify barriers to discharge with patient and caregiver   Identify discharge learning needs (meds, wound care, etc)   Refer to discharge planning if patient needs post-hospital services based on physician order or complex needs related to functional status, cognitive ability or social support system   Arrange for needed discharge resources and transportation as appropriate     Problem: Pain  Goal: Verbalizes/displays adequate comfort level or baseline comfort level  Outcome: Progressing     Problem: Safety - Adult  Goal: Free from fall injury  Outcome: Progressing     Problem: ABCDS Injury Assessment  Goal: Absence of physical injury  Outcome: Progressing     Problem: Skin/Tissue Integrity - Adult  Goal: Skin integrity remains intact  Outcome: Progressing  Goal: Incisions, wounds, or drain sites healing without S/S of infection  Outcome: Progressing  Goal: Oral mucous membranes remain intact  Outcome: Progressing     Problem: Infection - Adult  Goal: Absence of infection at discharge  Outcome: Progressing  Goal: Absence of infection during hospitalization  Outcome: Progressing  Goal: Absence of fever/infection during anticipated neutropenic period  Outcome: Progressing     Problem: Respiratory - Adult  Goal: Achieves optimal ventilation and oxygenation  Outcome: Progressing     Problem: Musculoskeletal - Adult  Goal: Return mobility to safest level of function  Outcome: Progressing  Goal: Maintain proper alignment of affected body part  Outcome: Progressing  Goal: Return ADL status to a safe level of function  Outcome: Progressing

## 2023-02-07 NOTE — ED NOTES
Awake alert tomy treatment well  States able to breath better     Leticia Sandoval RN  02/06/23 1956

## 2023-02-07 NOTE — ED PROVIDER NOTES
MidLevel Attestation   I havepersonally performed and/or participated in the history, exam and medical decision making and agree with all pertinent clinical information. I have also reviewed and agree with the past medical, family and social historyunless otherwise noted. I have personally performed a face to face diagnostic evaluation onthis patient. I have reviewed the mid-levels findings and agree. In brief, Chelsey Vega is a 40 y.o. female that presented to the emergency department with   Chief Complaint   Patient presents with    Pharyngitis     Difficulty swallowing   . CONSTITUTIONAL: ill  appearing, in no acute distress   EYES: PERRL, No injection, discharge or scleral icterus. NECK: Normal ROM, NO LAD and Moist mucous membranes. CARDIOVASCULAR: Regular rate and rhythm. No murmurs or gallop. PULMONARY/CHEST: Airway patent. No retractions. wheezing bilaterally. ABDOMEN: Soft, Non-distended and non-tender, without guarding or rebound. SKIN: Acyanotic, warm, dry   MUSCULOSKELETAL: No swelling, tenderness or deformity   NEUROLOGICAL: Awake. Pulses intact. Grossly nonfocal     61-year-old female who presents complaining of neck pain and swelling. On exam patient appears ill but nontoxic. She was wheezing. There was no stridor. Given her presentation with the fact that she has poor dentition we will concern for peritonsillar abscess versus retropharyngeal abscess. Labs were obtained and a CT scan which confirmed a large abscess. We consulted with ENT. They recommended transferring patient today [facility].  She was started on antibiotics and hospitalist was consulted and accepted patient for admission.     I have reviewed and interpreted all of the currently available lab results and diagnostics from this visit:  Results for orders placed or performed during the hospital encounter of 02/06/23   Strep Screen Group A Throat    Specimen: Throat   Result Value Ref Range    Rapid Strep A Screen POSITIVE (A) Negative   Rapid influenza A/B antigens    Specimen: Nasopharyngeal   Result Value Ref Range    Rapid Influenza A Ag Negative Negative    Rapid Influenza B Ag Negative Negative   COVID-19, Rapid    Specimen: Nasopharyngeal Swab   Result Value Ref Range    SARS-CoV-2, NAAT Not Detected Not Detected   HCG Qualitative, Serum   Result Value Ref Range    hCG Qual Negative Detects HCG level >10 MIU/mL   Comprehensive Metabolic Panel w/ Reflex to MG   Result Value Ref Range    Sodium 139 136 - 145 mmol/L    Potassium reflex Magnesium 4.1 3.5 - 5.1 mmol/L    Chloride 100 99 - 110 mmol/L    CO2 28 21 - 32 mmol/L    Anion Gap 11 3 - 16    Glucose 96 70 - 99 mg/dL    BUN 8 7 - 20 mg/dL    Creatinine 0.7 0.6 - 1.1 mg/dL    Est, Glom Filt Rate >60 >60    Calcium 9.3 8.3 - 10.6 mg/dL    Total Protein 8.1 6.4 - 8.2 g/dL    Albumin 3.9 3.4 - 5.0 g/dL    Albumin/Globulin Ratio 0.9 (L) 1.1 - 2.2    Total Bilirubin 0.4 0.0 - 1.0 mg/dL    Alkaline Phosphatase 91 40 - 129 U/L    ALT 23 10 - 40 U/L    AST 11 (L) 15 - 37 U/L   CBC with Auto Differential   Result Value Ref Range    WBC 14.6 (H) 4.0 - 11.0 K/uL    RBC 5.27 (H) 4.00 - 5.20 M/uL    Hemoglobin 13.9 12.0 - 16.0 g/dL    Hematocrit 41.7 36.0 - 48.0 %    MCV 79.1 (L) 80.0 - 100.0 fL    MCH 26.3 26.0 - 34.0 pg    MCHC 33.2 31.0 - 36.0 g/dL    RDW 14.9 12.4 - 15.4 %    Platelets 187 951 - 189 K/uL    MPV 8.8 5.0 - 10.5 fL    Neutrophils % 73.4 %    Lymphocytes % 16.7 %    Monocytes % 7.6 %    Eosinophils % 1.5 %    Basophils % 0.8 %    Neutrophils Absolute 10.7 (H) 1.7 - 7.7 K/uL    Lymphocytes Absolute 2.4 1.0 - 5.1 K/uL    Monocytes Absolute 1.1 0.0 - 1.3 K/uL    Eosinophils Absolute 0.2 0.0 - 0.6 K/uL    Basophils Absolute 0.1 0.0 - 0.2 K/uL   Blood Gas, Venous   Result Value Ref Range    pH, Woo 7.413 7.350 - 7.450    pCO2, Woo 44.3 40.0 - 50.0 mmHg    pO2, Woo 52.6 (H) 25.0 - 40.0 mmHg    HCO3, Venous 28.2 23.0 - 29.0 mmol/L    Base Excess, Woo 3.6 (H) -3.0 - 3.0 mmol/L    O2 Sat, Woo 87 Not Established %    TC02 (Calc), Woo 30 Not Established mmol/L    O2 Therapy Unknown      XR CHEST (2 VW)    Result Date: 2/6/2023  EXAMINATION: TWO XRAY VIEWS OF THE CHEST 2/6/2023 3:09 pm COMPARISON: 01/10/2016 HISTORY: ORDERING SYSTEM PROVIDED HISTORY: yoana TECHNOLOGIST PROVIDED HISTORY: Reason for exam:->wheezi Reason for Exam: fever, cough, wheezing, nausea times five days FINDINGS: No acute pulmonary consolidation, airspace infiltrate, pleural effusion, pneumothorax, pulmonary edema/vascular congestion, cardiomegaly or mediastinal widening. Subjective central bronchial pulmonary marking prominence noted in the hilar regions consistent with central bronchitis. Subjective findings consistent with central bronchitis. Otherwise, radiographically nonacute chest.     CT SOFT TISSUE NECK W CONTRAST    Result Date: 2/6/2023  EXAMINATION: CT OF THE NECK SOFT TISSUE WITH CONTRAST  2/6/2023 TECHNIQUE: CT of the neck was performed with the administration of intravenous contrast. Multiplanar reformatted images are provided for review. Automated exposure control, iterative reconstruction, and/or weight based adjustment of the mA/kV was utilized to reduce the radiation dose to as low as reasonably achievable. COMPARISON: None HISTORY: ORDERING SYSTEM PROVIDED HISTORY: Peritonsillar abscess rule out TECHNOLOGIST PROVIDED HISTORY: Reason for exam:->Peritonsillar abscess rule out Decision Support Exception - unselect if not a suspected or confirmed emergency medical condition->Emergency Medical Condition (MA) Reason for Exam: swelling, pain in throat times four to five days FINDINGS: PHARYNX/LARYNX:  There is a 33 x 23 x 26 mm right tonsillar abscess. There is moderate edema in the right lateral pharyngeal wall. The larynx is normal. SALIVARY GLANDS/THYROID:  The parotid and submandibular glands appear unremarkable. The thyroid gland appears unremarkable.  LYMPH NODES:  Right worse than left upper cervical lymph node enlargement is likely reactive. SOFT TISSUES:  No appreciable soft tissue swelling or mass is seen. BRAIN/ORBITS/SINUSES:  The visualized portion of the intracranial contents appear unremarkable. The visualized portion of the orbits, paranasal sinuses and mastoid air cells demonstrate no acute abnormality. LUNG APICES/SUPERIOR MEDIASTINUM:  No focal consolidation is seen within the visualized lung apices. No superior mediastinal lymphadenopathy or mass. The visualized portion of the trachea appears unremarkable. BONES:  There are severe dental caries with periapical lucencies. 33 mm right palatine tonsil abscess.       ED Medication Orders (From admission, onward)      Start Ordered     Status Ordering Provider    02/06/23 1815 02/06/23 1808  Dexamethasone Sodium Phosphate injection 10 mg  ONCE         Ordered DIMPLE BLAIR    02/06/23 1800 02/06/23 1752  ampicillin-sulbactam (UNASYN) 3,000 mg in sodium chloride 0.9 % 100 mL IVPB (mini-bag)  ONCE        Question:  Antimicrobial Indications  Answer:  Head and Neck Infection    Acknowledged Toshia Ast    02/06/23 1719 02/06/23 1719  iopamidol (ISOVUE-370) 76 % injection 100 mL  IMG ONCE PRN         Last MAR action: Given - by Luca Thomson on 02/06/23 at 1721 SHAHRZAD, NSEHNIITOOH A    02/06/23 1600 02/06/23 1558  predniSONE (DELTASONE) tablet 60 mg  ONCE         Last MAR action: Given - by Slim Marsh on 02[facility]06/23 [facility]    02/06/23 1530 02/06/23 1515  albuterol (PROVENTIL) nebulizer solution 5 mg  ONCE        Question:  Initiate RT Bronchodilator Protocol  Answer:  Yes    Last MAR action: Given - by Slim Marsh on 02/06/23 at [address]    02/06/23 1530 02/06/23 1515  ipratropium-albuterol (DUONEB) nebulizer solution 1 ampule  ONCE        Question:  Initiate RT Bronchodilator Protocol  Answer:  Yes    Last MAR action: Given - by Slim Marsh on 02/06/23 at [address] 02/06/23 1515 02/06/23 1508  lactated ringers bolus  ONCE         Last MAR action: New Bag - by Venkata Current on 02/06/23 at [facility] [facility]    02/06/23 1515 02/06/23 1509  acetaminophen (TYLENOL) tablet 1,000 mg  ONCE         Last MAR action: Given - by Venkata Dillard on 02/06/23 at [address]            Final Impression      1. Strep pharyngitis    2. Tonsillar abscess    3. Smoker        DISPOSITION Decision To Admit 02/06/2023 05:56:31 PM       CRITICAL CARE NOTE:  There was a high probability of clinically significant life-threatening deterioration of the patient's condition requiring my urgent intervention. This includes multiple reevaluations, vital sign monitoring, pulse oximetry monitoring, telemetry monitoring, clinical response to the IV medications, reviewing the nursing notes, consultation time, dictation/documentation time, and interpretation of the labwork. (This time excludes time spent performing procedures). I personally saw the patient and independently provided 35 minutes of non-concurrent critical care out of the total shared critical care time provided. This record is transcribed utilizing voice recognition technology. There are inherent limitations in this technology. In addition, there may be limitations in editing of this report. If there are any discrepancies, please contact me directly.     Yamile Weaver MD   2/6/2023         Kenzie Dean MD  02/06/23 [id number]

## 2023-02-07 NOTE — PLAN OF CARE
Problem: Discharge Planning  Goal: Discharge to home or other facility with appropriate resources  2/7/2023 1102 by Candice Perez RN  Outcome: Progressing     Problem: Pain  Goal: Verbalizes/displays adequate comfort level or baseline comfort level  2/7/2023 1102 by Candice Perez RN  Outcome: Progressing     Problem: Safety - Adult  Goal: Free from fall injury  2/7/2023 1102 by Candice Perez RN  Outcome: Progressing     Problem: ABCDS Injury Assessment  Goal: Absence of physical injury  2/7/2023 1102 by Candice Perez RN  Outcome: Progressing     Problem: Skin/Tissue Integrity - Adult  Goal: Skin integrity remains intact  2/7/2023 1102 by Candice Perez RN  Outcome: Progressing     Problem: Skin/Tissue Integrity - Adult  Goal: Incisions, wounds, or drain sites healing without S/S of infection  2/7/2023 1102 by Candice Perez RN  Outcome: Progressing     Problem: Skin/Tissue Integrity - Adult  Goal: Oral mucous membranes remain intact  2/7/2023 1102 by Candice Perez RN  Outcome: Progressing     Problem: Infection - Adult  Goal: Absence of infection at discharge  2/7/2023 1102 by Candice Perez RN  Outcome: Progressing     Problem: Infection - Adult  Goal: Absence of infection during hospitalization  2/7/2023 1102 by Candice Perez RN  Outcome: Progressing     Problem: Infection - Adult  Goal: Absence of fever/infection during anticipated neutropenic period  2/7/2023 1102 by Candice Perez RN  Outcome: Progressing     Problem: Respiratory - Adult  Goal: Achieves optimal ventilation and oxygenation  2/7/2023 1102 by Candice Perez RN  Outcome: Progressing     Problem: Musculoskeletal - Adult  Goal: Return mobility to safest level of function  2/7/2023 1102 by Candice Perez RN  Outcome: Progressing     Problem: Musculoskeletal - Adult  Goal: Maintain proper alignment of affected body part  2/7/2023 1102 by Candice Perez RN  Outcome: Progressing     Problem: Musculoskeletal - Adult  Goal: Return ADL status to a safe level of function  2/7/2023 1102 by Roseanna Flores RN  Outcome: Progressing     Problem: Neurosensory - Adult  Goal: Achieves stable or improved neurological status  Outcome: Progressing     Problem: Neurosensory - Adult  Goal: Remains free of injury related to seizures activity  Outcome: Progressing     Problem: Neurosensory - Adult  Goal: Achieves maximal functionality and self care  Outcome: Progressing

## 2023-02-07 NOTE — H&P
Hospital Medicine History & Physical      PCP: No primary care provider on file. Date of Admission: 2/6/2023    Date of Service: Pt seen/examined on 2/6/2023 and Admitted to Inpatient        Chief Complaint:        History Of Present Illness: The patient is a 40 y.o. female who presents to [facility] with PMHx: Morbid obesity, opiate addiction recovery, tobacco smoker, asthma, bipolar, HPV cancer, depression, liver disease    Lives at home with her family  Anticoagulation therapy: None  CODE STATUS full    Social indiscriminates: Unemployed    HPI provided by patient    Patient presented to [address] [place] [address] ED for sore throat for 4 days. Worse on the right. Having a difficult time taking oral medications. Also noticing muffled voice or thick voice. Also noting wheezing, has an inhaler at home but not using it. Positive daily tobacco use    Goes to the methadone clinic daily to receive methadone. Current dosing 98 mg daily. ED workup: Positive leukocytosis 92.5, metabolic panel unremarkable. Pregnancy negative. VBG base excess of 3.6, lactic acid of 1.1 chest x-ray unremarkable. CT soft tissue neck: 33 mm right Doc tonsil abscess  Patient was started on Unasyn IV piggyback, IV steroids, ENT consult made from the emergency department recommending patient be admitted and ENT will see in a.m. keep patient n.p.o. On my exam: Patient is awake and alert. Airway is patent. No drooling. Can speak. Self endorses that she is speaking and swallowing much easier now than she was earlier today. Lung fields positive for scattered wheezing.         Past Medical History:        Diagnosis Date    Abuse, drug or alcohol ([facility]     iV DRUG USE    Asthma     bronchitis induced    Asthma exacerbation attacks 1/16/2014    Bipolar 1 disorder ([facility]     [facility]    hpv    Depression     Liver disease hep C       Past Surgical History:        Procedure Laterality Date    ECTOPIC PREGNANCY SURGERY      with tubal removal       Medications Prior to Admission:    Prior to Admission medications    Medication Sig Start Date End Date Taking? Authorizing Provider   methadone 10 MG/5ML solution Take 98 mg by mouth daily. Yes Historical Provider, MD   QUEtiapine (SEROQUEL) 200 MG tablet Take 200 mg by mouth nightly   Yes Historical Provider, MD   albuterol sulfate HFA (PROAIR HFA) 108 (90 BASE) MCG/ACT inhaler Use every 4 hours while awake for 7-10 days then PRN wheezing  Dispense with SPACER and Instruct on use. May sub Ventolin or Proventil as needed per [employer]. 1/10/16   Annmarie Smith DO       Allergies:  Patient has no known allergies. Social History:  The patient currently lives at home    TOBACCO:   reports that she has been smoking cigarettes. She has a 19.50 pack-year smoking history. She has never used smokeless tobacco.  ETOH:   reports no history of alcohol use. Family History:  Reviewed in detail and negative for DM, Early CAD, Cancer, CVA. Positive as follows:        Problem Relation Age of Onset    Asthma Other     Asthma Mother     Cancer Mother     Cancer Maternal Aunt     High Blood Pressure Maternal Uncle     Learning Disabilities Maternal Uncle     Asthma Maternal Grandmother     Cancer Maternal Grandmother        REVIEW OF SYSTEMS:   as noted in the HPI. All other systems reviewed and negative. PHYSICAL EXAM:    BP (!) 150/85   Pulse 75   Temp 98.8 °F (37.1 °C) (Oral)   Resp 18   Ht 5' 3\" (1.6 m)   Wt 265 lb 3.4 oz (120.3 kg)   SpO2 91%   BMI 46.98 kg/m²     General appearance: No apparent distress appears stated age and cooperative. Morbidly obese, body habitus may interfere with exam  HEENT normocephalic atraumatic: Mallampati score 2-3, 4+ tonsillar edema, right greater than left. No uvular deviation that I can appreciate. Some evidence of exudate. Neck: Soft, anterior cervical lymphadenopathy, neck is short and obese  Lungs: Scattered wheezing throughout, respirations are easy and regular  Heart: Regular rate and rhythm. No murmur rub or gallop  Abdomen: Obese nontender. Extremities: Moves all extremities. Skin: Skin color, texture, turgor normal.  No rashes or lesions. Neurologic: Alert and oriented. Moving all extremities. No hemiparesis or paralysis. Mental status: Alert, oriented, thought content appropriate. Capillary Refill: Acceptable  < 3 seconds  Peripheral Pulses: +3 Easily felt, not easily obliterated with pressure      CXR:  I have reviewed the CXR with the following interpretation: No evidence of acute disease  EKG:  I have reviewed the EKG with the following interpretation: N/A    CBC   Recent Labs     02/06/23  1600   WBC 14.6*   HGB 13.9   HCT 41.7         RENAL  Recent Labs     02/06/23  1600      K 4.1      CO2 28   BUN 8   CREATININE 0.7     LFT'S  Recent Labs     02/06/23  1600   AST 11*   ALT 23   BILITOT 0.4   ALKPHOS 91     COAG  No results for input(s): INR in the last 72 hours. CARDIAC ENZYMES  No results for input(s): CKTOTAL, CKMB, CKMBINDEX, TROPONINI in the last 72 hours.     U/A:    Lab Results   Component Value Date/Time    COLORU DARK YELLOW 11/15/2016 06:20 PM    WBCUA >100 11/15/2016 06:20 PM    RBCUA see below 11/15/2016 06:20 PM    MUCUS Trace 08/24/2010 07:55 PM    BACTERIA 4+ 11/15/2016 06:20 PM    CLARITYU CLOUDY 11/15/2016 06:20 PM    SPECGRAV 1.020 11/15/2016 06:20 PM    LEUKOCYTESUR MODERATE 11/15/2016 06:20 PM    BLOODU LARGE 11/15/2016 06:20 PM    GLUCOSEU Negative 11/15/2016 06:20 PM    GLUCOSEU Negative 04/16/2012 04:45 PM       ABG  No results found for: Presley Whitley        Active Hospital Problems    Diagnosis Date Noted    Tonsillar abscess [J36] 02/06/2023     Priority: Medium         PHYSICIANS CERTIFICATION:    I certify that Emily Barnett is expected to be hospitalized for more than 2 midnights based on the following assessment and plan:      ASSESSMENT/PLAN:    Peritonsillar abscess: As evidenced on CT 38 mm  2/2-> strep pharyngitis  N.p.o.  Unasyn initiated in the ED-> continue on admission  IV steroid: Decadron initiated in ED-> continued on admission  ENT consulted from ED: Dr. Kade Bradley will see in a.m. for possible peritonsillar abscess drainage  Gentle IVF  Additional pain management Tylenol p.o. or suppository available prn  No evidence of sepsis  Meets SIRS criteria with leukocytosis and positive infection: Afebrile, no tachycardia, no hypoxia, no hypotension  Lactic acid is 1.1, WBC 14.6  Blood cultures were obtained at [address] [place] [address] ED  Repeat CBC metabolic panel and magnesium daily      Opiate recovery: Continue methadone per outpatient regimen: 98 mg daily  Continue Seroquel 200 mg nightly    Asthma: DuoNeb every 4 hours as needed    Severe morbid obesity: BMI 46.98, 265 pound  Intubation would be difficult as her Mallampati score is roughly about 2-3, she has a very short thick neck  This also places the patient for ventilation perfusion issues, hypoventilation syndrome    DVT Prophylaxis: Lovenox 30 twice daily  Diet: Diet NPO Exceptions are: Ice Chips  Code Status: Full Code  PT/OT Eval Status: Independent    Dispo -admit, inpatient       Wanye Erin Ohara, APRN - CNP    Thank you No primary care provider on file. for the opportunity to be involved in this patient's care. If you have any questions or concerns please feel free to contact me at [phone]. This dictation was performed with a verbal recognition program (DRAGON) and it was checked for errors. It is possible that there are still dictated errors within this office note. If so, please bring any errors to my attention for an addendum. All efforts were made to ensure that this office note is accurate.

## 2023-02-07 NOTE — DISCHARGE SUMMARY
Hospital Discharge Summary    Patient's PCP: No primary care provider on file. Admit Date: 2/6/2023   Discharge Date: 2/7/2023    Admitting Physician: Dr. Luis F Do MD  Discharge Physician: Dr. Neelima Yanez MD     Consults:   IP CONSULT TO OTOLARYNGOLOGY  IP CONSULT TO OTOLARYNGOLOGY    Brief HPI: Patient admitted with tonsillar abscess    Brief hospital course: 29-year-old female with history of asthma, IV drug use, chronic methadone use, bipolar disorder, depression, chronic hepatitis C, morbid obesity with BMI of 44 kg/m², tobacco use disorder, HPV cancer, who presents to the emergency room with complaints of sore throat that been ongoing for about 4 days prior to ER visit. She also reported muffled voice. She reports noncompliance with breathing treatments, noted to be wheezing in the emergency room. She had leukocytosis of 14,600. CT-soft tissue neck revealed 33 mm right palatine tonsil abscess. ENT consulted from the emergency room. She was started on Unasyn and steroid. Assessment/plan:  Tonsillar abscess. She is currently on unasyn, steroid. She has been on intravenous fluid. ENT planning drainage in office at 11 AM today, after which they said patient is able to discharge. Sent prescription to pharmacy for cefuroxime. History of asthma, noncompliance with medication use. She has some wheezes, which she reports to be \"baseline. \" Will discharge with a prescription for prednisone and breathing treatments. IV drug use, tobacco use. Counseled about cessation of illicit drug use. Counseled about cessation of tobacco use. NicoDerm patch ordered. Other comorbidities: history of asthma, IV drug use, chronic methadone use, bipolar disorder, depression, chronic hepatitis C, morbid obesity with BMI of 44 kg/m², tobacco use disorder, HPV cancer. Discharge once cleared from ENT standpoint. Invasive procedures:   To undergo aspiration of tonsillar abscess by ENT at 11 AM.    Discharge Diagnoses:   See above. Physical Exam: /86   Pulse 81   Temp 97.5 °F (36.4 °C) (Oral)   Resp 16   Ht 5' 5\" (1.651 m)   Wt 265 lb 3.4 oz (120.3 kg)   SpO2 94%   BMI 44.13 kg/m²   Gen/overall appearance: Not in acute distress. Alert. Oriented x3. Head: Normocephalic, atraumatic  Eyes: EOMI, good acuity  ENT: Oral mucosa moist  Neck: No JVD, thyromegaly  CVS: Nml S1S2, no MRG, RRR  Pulm: Faint expiratory wheezes present. No accessory muscle use. Gastrointestinal: Soft, NT/ND, +BS  Musculoskeletal: No edema. Warm  Neuro: No focal deficit. Moves extremity spontaneously. Psychiatry: Appropriate affect. Not agitated. Skin: Warm, dry with normal turgor. No rash  Capillary refill: Brisk,< 3 seconds   Peripheral Pulses: +2 palpable, equal bilaterally     Significant diagnostic studies that may require follow up:  XR CHEST (2 VW)    Result Date: 2/6/2023  EXAMINATION: TWO XRAY VIEWS OF THE CHEST 2/6/2023 3:09 pm COMPARISON: 01/10/2016 HISTORY: ORDERING SYSTEM PROVIDED HISTORY: yoana TECHNOLOGIST PROVIDED HISTORY: Reason for exam:->wheezi Reason for Exam: fever, cough, wheezing, nausea times five days FINDINGS: No acute pulmonary consolidation, airspace infiltrate, pleural effusion, pneumothorax, pulmonary edema/vascular congestion, cardiomegaly or mediastinal widening. Subjective central bronchial pulmonary marking prominence noted in the hilar regions consistent with central bronchitis. Subjective findings consistent with central bronchitis. Otherwise, radiographically nonacute chest.     CT SOFT TISSUE NECK W CONTRAST    Result Date: 2/6/2023  EXAMINATION: CT OF THE NECK SOFT TISSUE WITH CONTRAST  2/6/2023 TECHNIQUE: CT of the neck was performed with the administration of intravenous contrast. Multiplanar reformatted images are provided for review.  Automated exposure control, iterative reconstruction, and/or weight based adjustment of the mA/kV was utilized to reduce the radiation dose to as low as reasonably achievable. COMPARISON: None HISTORY: ORDERING SYSTEM PROVIDED HISTORY: Peritonsillar abscess rule out TECHNOLOGIST PROVIDED HISTORY: Reason for exam:->Peritonsillar abscess rule out Decision Support Exception - unselect if not a suspected or confirmed emergency medical condition->Emergency Medical Condition (MA) Reason for Exam: swelling, pain in throat times four to five days FINDINGS: PHARYNX/LARYNX:  There is a 33 x 23 x 26 mm right tonsillar abscess. There is moderate edema in the right lateral pharyngeal wall. The larynx is normal. SALIVARY GLANDS/THYROID:  The parotid and submandibular glands appear unremarkable. The thyroid gland appears unremarkable. LYMPH NODES:  Right worse than left upper cervical lymph node enlargement is likely reactive. SOFT TISSUES:  No appreciable soft tissue swelling or mass is seen. BRAIN/ORBITS/SINUSES:  The visualized portion of the intracranial contents appear unremarkable. The visualized portion of the orbits, paranasal sinuses and mastoid air cells demonstrate no acute abnormality. LUNG APICES/SUPERIOR MEDIASTINUM:  No focal consolidation is seen within the visualized lung apices. No superior mediastinal lymphadenopathy or mass. The visualized portion of the trachea appears unremarkable. BONES:  There are severe dental caries with periapical lucencies. 33 mm right palatine tonsil abscess. Treatments: As above.     Discharge Medications:     Medication List        START taking these medications      budesonide-formoterol 160-4.5 MCG/ACT Aero  Commonly known as: Symbicort  Inhale 2 puffs into the lungs 2 times daily     cefUROXime 500 MG tablet  Commonly known as: CEFTIN  Take 1 tablet by mouth 2 times daily for 12 days     ibuprofen 800 MG tablet  Commonly known as: ADVIL;MOTRIN  Take 1 tablet by mouth every 8 hours as needed for Pain     ipratropium-albuterol 0.5-2.5 (3) MG/3ML Soln nebulizer solution  Commonly known as: DUONEB  Inhale 3 mLs into the lungs every 4 hours as needed for Shortness of Breath     montelukast 10 MG tablet  Commonly known as: SINGULAIR  Take 1 tablet by mouth nightly     nicotine 21 MG/24HR  Commonly known as: NICODERM CQ  Place 1 patch onto the skin daily     predniSONE 20 MG tablet  Commonly known as: DELTASONE  Take 2 tablets by mouth daily for 5 days            CHANGE how you take these medications      albuterol sulfate  (90 Base) MCG/ACT inhaler  Commonly known as: ProAir HFA  Inhale 2 puffs into the lungs every 4 hours as needed for Wheezing or Shortness of Breath Use every 4 hours while awake for 7-10 days then PRN wheezing  Dispense with SPACER and Instruct on use. May sub Ventolin or Proventil as needed per [employer].   What changed:   how much to take  how to take this  when to take this  reasons to take this            CONTINUE taking these medications      methadone 10 MG/5ML solution     QUEtiapine 200 MG tablet  Commonly known as: SEROQUEL               Where to Get Your Medications        These medications were sent to [address] [place] [address] [phone]  [address] [place] [address], [address] [place] [address]      Phone: [phone]   albuterol sulfate  (90 Base) MCG/ACT inhaler  budesonide-formoterol 160-4.5 MCG/ACT Aero  cefUROXime 500 MG tablet  ibuprofen 800 MG tablet  ipratropium-albuterol 0.5-2.5 (3) MG/3ML Soln nebulizer solution  montelukast 10 MG tablet  nicotine 21 MG/24HR  predniSONE 20 MG tablet         Activity: activity as tolerated  Diet: Regular diet    Disposition: home  Discharged Condition: Stable  Follow Up:   Misty Aceves MD  [address] [place], [place] [address]Th [place] [address]  [phone]    Schedule an appointment as soon as possible for a visit in 1 week(s)      Kimberlyn Ballard [facility]  [address] [facility] [address]    Schedule an appointment as soon as possible for a visit in 1 week(s)        Code status:  Full Code     Total time spent on discharge, finalizing medications, referrals and arranging outpatient follow up was more than 30 minutes      Thank you Dr. Enriquez primary care provider on file. for the opportunity to be involved in this patients care.

## 2023-02-07 NOTE — PROGRESS NOTES
[facility] (:  1985) is a 40 y.o. female, here for evaluation of the following chief complaint(s):  Abscess (TONSIL)      ASSESSMENT/PLAN:  1. Odynophagia  2. Peritonsillar abscess      This is a very pleasant 40 y.o. female here today for evaluation of the the above-noted complaints. Reviewed the patient CT scan ordered by another provider. Large right-sided peritonsillar abscess with inflammation of the lateral pharyngeal wall. No parapharyngeal extension. 3 cm right-sided peritonsillar abscess drained in clinic. Discussed expected postprocedural course. Some blood-tinged saliva and throat pain is normal.    Start naproxen. Stop cefuroxime. New prescription was provided to the patient for Augmentin for 10 days. Medical Decision Making: The following items were considered in medical decision making:  Independent review of images  Review / order clinical lab tests  Review / order radiology tests  Decision to obtain old records  Review and summation of old records as accessed through [facility] if applicable    SUBJECTIVE/OBJECTIVE:  KWASI    Keenan Lesches is here today for evaluation of throat pain. Patient states that this has been going on for about 4 days and gradually getting worse. She was seen in the emergency department where a CT scan was obtained showing significant inflammation of the right lateral pharyngeal wall and right peritonsillar abscess. I reviewed the scan. She is admitted to the hospital and IV antibiotics and steroids were provided to the patient. She was then discharged here to come for evaluation. Patient has never had anything like this in the past.  She has not been to see a dentist in a while. She admits to extremely poor dental hygiene. She is a former IV drug abuser. Recently had a relapse.   The patient was referred here for evaluation and management by the hospitalist Dr. Lamar Chao. REVIEW OF SYSTEMS  The following systems were reviewed and revealed the following in addition to any already discussed in the HPI:    PHYSICAL EXAM    GENERAL: No acute distress, alert and oriented, no hoarseness, strong voice  EYES: EOMI, Anti-icteric  HENT:   Head: Normocephalic and atraumatic. Face:  Symmetric, facial nerve intact  Right Ear: Normal external ear, normal external auditory canal, intact tympanic membrane with normal mobility and aerated middle ear  Left Ear: Normal external ear, normal external auditory canal, intact tympanic membrane with normal mobility and aerated middle ear  Mouth/Oral Cavity:  normal lips, Uvula is midline, no mucosal lesions, no trismus, extremely poor dentition, normal salivary quality/flow  Oropharynx/Larynx: Bulging of the right soft palate with erythema and edema. No uvular deviation. Nose:Normal external nasal appearance. Normal mucosa   NECK: Normal range of motion, no thyromegaly, trachea is midline, no lymphadenopathy, no neck masses, no crepitus  CHEST: Normal respiratory effort, no retractions, breathing comfortably      PROCEDURE  Incision and Drainage of Peritonsillar abscess. Preop: Right peritonsillar abscess  Postop: Same  Patient Name: Nikki Blackwood  YOB: 1985      Pre op: Peritonsillar abscess, right. Post op: Same  Procedure; I and D of right peritonsillar abscess. Procedure: After consent was obtained cetacaine was sprays into the pharynx. 2cc of 1% lidocaine with 1:100,000 epinephrine was injected into the anterior tonsillar pillar. After anesthesia was obtained an 11 blade was use to I and D the abscess with excellent evacuation of purulence. No complications. Patient tolerated well. I attest that I was present for and did the entire procedure myself. This note was generated completely or in part utilizing Dragon dictation speech recognition software.   Occasionally, words are mistranscribed and despite editing, the text may contain inaccuracies due to incorrect word recognition. If further clarification is needed please contact the office at [phone]. An electronic signature was used to authenticate this note.     --Abdirizak Osorio MD

## 2023-02-07 NOTE — PROGRESS NOTES
Patient discharged per MD order. IV removed intact. Patient given discharge instructions and verbalized instructions. Patient acknowledged having to  [facility] before leaving [facility]. Patient transported by wheelchair to ENT office in [address] [facility] for procedure. Patient will be transported home by family after procedure.

## 2023-02-07 NOTE — ED NOTES
Iv site without edema or redness flushed easily  Aware and agreeable to admission  Aware bed is being cleaned     Coty De Luna RN  02/06/23 2001

## 2023-02-09 LAB
CULTURE, BLOOD 2: ABNORMAL
CULTURE, BLOOD 2: ABNORMAL
ORGANISM: ABNORMAL

## 2023-02-09 NOTE — RESULT ENCOUNTER NOTE
This bacteria is known to be a skin contaminant and was only isolated in 1 of 2 cultures meaning it likely was contamination. Furthermore patient was discharged home on p.o. antibiotics. Not feel any emergent reevaluation is indicated at this time and feel patient was discharged home with appropriate return precautions.

## 2023-02-11 LAB — BLOOD CULTURE, ROUTINE: NORMAL

## 2024-05-09 ENCOUNTER — HOSPITAL ENCOUNTER (EMERGENCY)
Age: 39
Discharge: HOME | End: 2024-05-09
Payer: COMMERCIAL

## 2024-05-09 ENCOUNTER — APPOINTMENT (OUTPATIENT)
Dept: CT IMAGING | Age: 39
End: 2024-05-09
Payer: COMMERCIAL

## 2024-05-09 ENCOUNTER — APPOINTMENT (OUTPATIENT)
Dept: GENERAL RADIOLOGY | Age: 39
End: 2024-05-09
Payer: COMMERCIAL

## 2024-05-09 VITALS
TEMPERATURE: 98.9 F | RESPIRATION RATE: 16 BRPM | HEIGHT: 65 IN | HEART RATE: 98 BPM | BODY MASS INDEX: 44.33 KG/M2 | WEIGHT: 266.1 LBS | DIASTOLIC BLOOD PRESSURE: 86 MMHG | OXYGEN SATURATION: 93 % | SYSTOLIC BLOOD PRESSURE: 127 MMHG

## 2024-05-09 DIAGNOSIS — F19.10 POLYSUBSTANCE ABUSE (HCC): ICD-10-CM

## 2024-05-09 DIAGNOSIS — J45.41 MODERATE PERSISTENT ASTHMA WITH ACUTE EXACERBATION: ICD-10-CM

## 2024-05-09 DIAGNOSIS — N12 PYELONEPHRITIS OF LEFT KIDNEY: Primary | ICD-10-CM

## 2024-05-09 DIAGNOSIS — F17.200 SMOKER: ICD-10-CM

## 2024-05-09 DIAGNOSIS — N30.01 ACUTE CYSTITIS WITH HEMATURIA: ICD-10-CM

## 2024-05-09 DIAGNOSIS — Z87.09 HISTORY OF ASTHMA: ICD-10-CM

## 2024-05-09 LAB
ALBUMIN SERPL-MCNC: 3.6 G/DL (ref 3.4–5)
ALBUMIN/GLOB SERPL: 0.9 {RATIO} (ref 1.1–2.2)
ALP SERPL-CCNC: 85 U/L (ref 40–129)
ALT SERPL-CCNC: 17 U/L (ref 10–40)
AMPHETAMINES UR QL SCN>1000 NG/ML: ABNORMAL
ANION GAP SERPL CALCULATED.3IONS-SCNC: 14 MMOL/L (ref 3–16)
AST SERPL-CCNC: 13 U/L (ref 15–37)
BACTERIA URNS QL MICRO: ABNORMAL /HPF
BARBITURATES UR QL SCN>200 NG/ML: ABNORMAL
BASE EXCESS BLDV CALC-SCNC: 4.5 MMOL/L (ref -3–3)
BASOPHILS # BLD: 0.1 K/UL (ref 0–0.2)
BASOPHILS NFR BLD: 0.6 %
BENZODIAZ UR QL SCN>200 NG/ML: ABNORMAL
BILIRUB SERPL-MCNC: 0.3 MG/DL (ref 0–1)
BILIRUB UR QL STRIP.AUTO: NEGATIVE
BUN SERPL-MCNC: 11 MG/DL (ref 7–20)
CALCIUM SERPL-MCNC: 9 MG/DL (ref 8.3–10.6)
CANNABINOIDS UR QL SCN>50 NG/ML: ABNORMAL
CHLORIDE SERPL-SCNC: 100 MMOL/L (ref 99–110)
CLARITY UR: CLEAR
CO2 BLDV-SCNC: 30 MMOL/L
CO2 SERPL-SCNC: 25 MMOL/L (ref 21–32)
COCAINE UR QL SCN: POSITIVE
COLOR UR: YELLOW
CREAT SERPL-MCNC: 0.8 MG/DL (ref 0.6–1.1)
DEPRECATED RDW RBC AUTO: 14 % (ref 12.4–15.4)
DRUG SCREEN COMMENT UR-IMP: ABNORMAL
EOSINOPHIL # BLD: 0 K/UL (ref 0–0.6)
EOSINOPHIL NFR BLD: 0.1 %
EPI CELLS #/AREA URNS HPF: ABNORMAL /HPF (ref 0–5)
FENTANYL SCREEN, URINE: POSITIVE
GFR SERPLBLD CREATININE-BSD FMLA CKD-EPI: >90 ML/MIN/{1.73_M2}
GLUCOSE SERPL-MCNC: 148 MG/DL (ref 70–99)
GLUCOSE UR STRIP.AUTO-MCNC: NEGATIVE MG/DL
HCG UR QL: NEGATIVE
HCO3 BLDV-SCNC: 30.6 MMOL/L (ref 23–29)
HCT VFR BLD AUTO: 41.4 % (ref 36–48)
HGB BLD-MCNC: 13.5 G/DL (ref 12–16)
HGB UR QL STRIP.AUTO: ABNORMAL
KETONES UR STRIP.AUTO-MCNC: NEGATIVE MG/DL
LACTATE BLDV-SCNC: 2.6 MMOL/L (ref 0.4–2)
LEUKOCYTE ESTERASE UR QL STRIP.AUTO: ABNORMAL
LYMPHOCYTES # BLD: 1.5 K/UL (ref 1–5.1)
LYMPHOCYTES NFR BLD: 8.9 %
MCH RBC QN AUTO: 26.9 PG (ref 26–34)
MCHC RBC AUTO-ENTMCNC: 32.7 G/DL (ref 31–36)
MCV RBC AUTO: 82.4 FL (ref 80–100)
METHADONE UR QL SCN>300 NG/ML: POSITIVE
MONOCYTES # BLD: 1.2 K/UL (ref 0–1.3)
MONOCYTES NFR BLD: 7 %
NEUTROPHILS # BLD: 14.5 K/UL (ref 1.7–7.7)
NEUTROPHILS NFR BLD: 83.4 %
NITRITE UR QL STRIP.AUTO: POSITIVE
NT-PROBNP SERPL-MCNC: 165 PG/ML (ref 0–124)
O2 THERAPY: ABNORMAL
OPIATES UR QL SCN>300 NG/ML: POSITIVE
OXYCODONE UR QL SCN: ABNORMAL
PCO2 BLDV: 58.9 MMHG (ref 40–50)
PCP UR QL SCN>25 NG/ML: ABNORMAL
PH BLDV: 7.32 [PH] (ref 7.35–7.45)
PH UR STRIP.AUTO: 6 [PH] (ref 5–8)
PH UR STRIP: 6 [PH]
PLATELET # BLD AUTO: 229 K/UL (ref 135–450)
PMV BLD AUTO: 9.7 FL (ref 5–10.5)
PO2 BLDV: 59.1 MMHG (ref 25–40)
POTASSIUM SERPL-SCNC: 4 MMOL/L (ref 3.5–5.1)
PROT SERPL-MCNC: 7.6 G/DL (ref 6.4–8.2)
PROT UR STRIP.AUTO-MCNC: 30 MG/DL
RBC # BLD AUTO: 5.03 M/UL (ref 4–5.2)
RBC #/AREA URNS HPF: ABNORMAL /HPF (ref 0–4)
SAO2 % BLDV: 87 %
SODIUM SERPL-SCNC: 139 MMOL/L (ref 136–145)
SP GR UR STRIP.AUTO: 1.02 (ref 1–1.03)
TROPONIN, HIGH SENSITIVITY: <6 NG/L (ref 0–14)
UA COMPLETE W REFLEX CULTURE PNL UR: YES
UA DIPSTICK W REFLEX MICRO PNL UR: YES
URN SPEC COLLECT METH UR: ABNORMAL
UROBILINOGEN UR STRIP-ACNC: 1 E.U./DL
WBC # BLD AUTO: 17.4 K/UL (ref 4–11)
WBC #/AREA URNS HPF: ABNORMAL /HPF (ref 0–5)

## 2024-05-09 PROCEDURE — 74176 CT ABD & PELVIS W/O CONTRAST: CPT

## 2024-05-09 PROCEDURE — 36415 COLL VENOUS BLD VENIPUNCTURE: CPT

## 2024-05-09 PROCEDURE — 96375 TX/PRO/DX INJ NEW DRUG ADDON: CPT

## 2024-05-09 PROCEDURE — 87088 URINE BACTERIA CULTURE: CPT

## 2024-05-09 PROCEDURE — 83880 ASSAY OF NATRIURETIC PEPTIDE: CPT

## 2024-05-09 PROCEDURE — 6360000002 HC RX W HCPCS: Performed by: PHYSICIAN ASSISTANT

## 2024-05-09 PROCEDURE — 85025 COMPLETE CBC W/AUTO DIFF WBC: CPT

## 2024-05-09 PROCEDURE — 71045 X-RAY EXAM CHEST 1 VIEW: CPT

## 2024-05-09 PROCEDURE — 6370000000 HC RX 637 (ALT 250 FOR IP): Performed by: PHYSICIAN ASSISTANT

## 2024-05-09 PROCEDURE — 81001 URINALYSIS AUTO W/SCOPE: CPT

## 2024-05-09 PROCEDURE — 87086 URINE CULTURE/COLONY COUNT: CPT

## 2024-05-09 PROCEDURE — 83605 ASSAY OF LACTIC ACID: CPT

## 2024-05-09 PROCEDURE — 80307 DRUG TEST PRSMV CHEM ANLYZR: CPT

## 2024-05-09 PROCEDURE — 84703 CHORIONIC GONADOTROPIN ASSAY: CPT

## 2024-05-09 PROCEDURE — 96361 HYDRATE IV INFUSION ADD-ON: CPT

## 2024-05-09 PROCEDURE — 93005 ELECTROCARDIOGRAM TRACING: CPT | Performed by: PHYSICIAN ASSISTANT

## 2024-05-09 PROCEDURE — 96365 THER/PROPH/DIAG IV INF INIT: CPT

## 2024-05-09 PROCEDURE — 99285 EMERGENCY DEPT VISIT HI MDM: CPT

## 2024-05-09 PROCEDURE — 80053 COMPREHEN METABOLIC PANEL: CPT

## 2024-05-09 PROCEDURE — 84484 ASSAY OF TROPONIN QUANT: CPT

## 2024-05-09 PROCEDURE — 82803 BLOOD GASES ANY COMBINATION: CPT

## 2024-05-09 PROCEDURE — 87186 SC STD MICRODIL/AGAR DIL: CPT

## 2024-05-09 PROCEDURE — 2580000003 HC RX 258: Performed by: PHYSICIAN ASSISTANT

## 2024-05-09 RX ORDER — IPRATROPIUM BROMIDE AND ALBUTEROL SULFATE 2.5; .5 MG/3ML; MG/3ML
1 SOLUTION RESPIRATORY (INHALATION) ONCE
Status: COMPLETED | OUTPATIENT
Start: 2024-05-09 | End: 2024-05-09

## 2024-05-09 RX ORDER — METHYLPREDNISOLONE SODIUM SUCCINATE 125 MG/2ML
125 INJECTION, POWDER, LYOPHILIZED, FOR SOLUTION INTRAMUSCULAR; INTRAVENOUS ONCE
Status: COMPLETED | OUTPATIENT
Start: 2024-05-09 | End: 2024-05-09

## 2024-05-09 RX ORDER — ACETAMINOPHEN 500 MG
1000 TABLET ORAL ONCE
Status: COMPLETED | OUTPATIENT
Start: 2024-05-09 | End: 2024-05-09

## 2024-05-09 RX ORDER — ALBUTEROL SULFATE 2.5 MG/3ML
5 SOLUTION RESPIRATORY (INHALATION) ONCE
Status: COMPLETED | OUTPATIENT
Start: 2024-05-09 | End: 2024-05-09

## 2024-05-09 RX ORDER — CEFDINIR 300 MG/1
300 CAPSULE ORAL 2 TIMES DAILY
Qty: 20 CAPSULE | Refills: 0 | Status: SHIPPED | OUTPATIENT
Start: 2024-05-09 | End: 2024-05-19

## 2024-05-09 RX ORDER — ALBUTEROL SULFATE 90 UG/1
1-2 AEROSOL, METERED RESPIRATORY (INHALATION) EVERY 6 HOURS PRN
Qty: 18 G | Refills: 0 | Status: SHIPPED | OUTPATIENT
Start: 2024-05-09

## 2024-05-09 RX ORDER — MAGNESIUM SULFATE IN WATER 40 MG/ML
2000 INJECTION, SOLUTION INTRAVENOUS ONCE
Status: COMPLETED | OUTPATIENT
Start: 2024-05-09 | End: 2024-05-09

## 2024-05-09 RX ORDER — 0.9 % SODIUM CHLORIDE 0.9 %
1000 INTRAVENOUS SOLUTION INTRAVENOUS ONCE
Status: COMPLETED | OUTPATIENT
Start: 2024-05-09 | End: 2024-05-09

## 2024-05-09 RX ORDER — QUETIAPINE FUMARATE 100 MG/1
100 TABLET, FILM COATED ORAL NIGHTLY
COMMUNITY

## 2024-05-09 RX ORDER — PREDNISONE 10 MG/1
20 TABLET ORAL DAILY
Qty: 10 TABLET | Refills: 0 | Status: SHIPPED | OUTPATIENT
Start: 2024-05-09 | End: 2024-05-14

## 2024-05-09 RX ADMIN — METHYLPREDNISOLONE SODIUM SUCCINATE 125 MG: 125 INJECTION INTRAMUSCULAR; INTRAVENOUS at 17:07

## 2024-05-09 RX ADMIN — IPRATROPIUM BROMIDE AND ALBUTEROL SULFATE 1 DOSE: .5; 2.5 SOLUTION RESPIRATORY (INHALATION) at 16:15

## 2024-05-09 RX ADMIN — ALBUTEROL SULFATE 5 MG: 2.5 SOLUTION RESPIRATORY (INHALATION) at 16:15

## 2024-05-09 RX ADMIN — MAGNESIUM SULFATE HEPTAHYDRATE 2000 MG: 40 INJECTION, SOLUTION INTRAVENOUS at 17:13

## 2024-05-09 RX ADMIN — ACETAMINOPHEN 1000 MG: 500 TABLET ORAL at 16:14

## 2024-05-09 RX ADMIN — SODIUM CHLORIDE 1000 ML: 9 INJECTION, SOLUTION INTRAVENOUS at 17:00

## 2024-05-09 RX ADMIN — WATER 1000 MG: 1 INJECTION INTRAMUSCULAR; INTRAVENOUS; SUBCUTANEOUS at 18:30

## 2024-05-09 ASSESSMENT — PAIN SCALES - GENERAL
PAINLEVEL_OUTOF10: 6
PAINLEVEL_OUTOF10: 3

## 2024-05-09 ASSESSMENT — PAIN - FUNCTIONAL ASSESSMENT: PAIN_FUNCTIONAL_ASSESSMENT: 0-10

## 2024-05-09 NOTE — ED NOTES
HHN finished. Pulse 105  RR 22  O2 sat 100%   still with some exp wheezes but continues to move more air overall.       PA-C to bedside discussing test results so far.

## 2024-05-09 NOTE — DISCHARGE INSTRUCTIONS
CT scan showing evidence of mild kidney infection related to your bladder infection.  Also had wheezing on exam and you have bronchitis with associated bronchospasm.  This is worsened by your smoking tobacco.  In the emergency room you were given fluids, steroids, breathing treatments and antibiotics.  I have sent medication to your local pharmacy for refill your inhaler, antibiotics and steroids.  Follow-up with healthcare provider next week.  Return to facility if symptoms do not improve or worsen.

## 2024-05-09 NOTE — ED NOTES
Discharge instructions with pt.  Explained rx's.   Encouraged follow up with PCP and to return to ED as needed.   No back pain or SOB or HA.    PA-C encouraged no drug use and to cut down smoking cigarettes.

## 2024-05-09 NOTE — ED PROVIDER NOTES
medical history of Abuse, drug or alcohol (HCC), Asthma, Asthma exacerbation attacks (1/16/2014), Bipolar 1 disorder (HCC), Cancer (HCC) (cervical), Depression, and Liver disease (hep C).     EMERGENCY DEPARTMENT COURSE and DIFFERENTIAL DIAGNOSIS/MDM:   Vitals:    Vitals:    05/09/24 1730 05/09/24 1800 05/09/24 1845 05/09/24 1908   BP: (!) 130/58 106/83 116/71 127/86   Pulse: (!) 101 96 97 98   Resp: 20 18 16 16   Temp:    98.9 °F (37.2 °C)   TempSrc:    Oral   SpO2: (!) 89% 91% (!) 89% 93%       Patient was given the following medications:  Medications   magnesium sulfate 2000 mg in 50 mL IVPB premix (0 mg IntraVENous Stopped 5/9/24 1743)   albuterol (PROVENTIL) (2.5 MG/3ML) 0.083% nebulizer solution 5 mg (5 mg Nebulization Given 5/9/24 1615)   ipratropium 0.5 mg-albuterol 2.5 mg (DUONEB) nebulizer solution 1 Dose (1 Dose Inhalation Given 5/9/24 1615)   methylPREDNISolone sodium succ (SOLU-MEDROL) injection 125 mg (125 mg IntraVENous Given 5/9/24 1707)   sodium chloride 0.9 % bolus 1,000 mL (0 mLs IntraVENous Stopped 5/9/24 1840)   acetaminophen (TYLENOL) tablet 1,000 mg (1,000 mg Oral Given 5/9/24 1614)   cefTRIAXone (ROCEPHIN) 1,000 mg in sterile water 10 mL IV syringe (1,000 mg IntraVENous Given 5/9/24 1830)             Is this patient to be included in the SEP-1 Core Measure due to severe sepsis or septic shock?   No   Exclusion criteria - the patient is NOT to be included for SEP-1 Core Measure due to:  Infection is not suspected    Chronic Conditions affecting care: Polysubstance abuse, asthma, smoker   has a past medical history of Abuse, drug or alcohol (HCC), Asthma, Asthma exacerbation attacks (1/16/2014), Bipolar 1 disorder (HCC), Cancer (HCC) (cervical), Depression, and Liver disease (hep C).    CONSULTS: (Who and What was discussed)  None    Records Reviewed (External and Source) none    CC/HPI Summary, DDx, ED Course, and Reassessment:     The patient presenting with 2 to 3-day history of progressive

## 2024-05-10 LAB
EKG ATRIAL RATE: 111 BPM
EKG DIAGNOSIS: NORMAL
EKG P-R INTERVAL: 96 MS
EKG Q-T INTERVAL: 486 MS
EKG QRS DURATION: 96 MS
EKG QTC CALCULATION (BAZETT): 660 MS
EKG R AXIS: 83 DEGREES
EKG T AXIS: 29 DEGREES
EKG VENTRICULAR RATE: 111 BPM

## 2024-05-10 PROCEDURE — 93010 ELECTROCARDIOGRAM REPORT: CPT | Performed by: INTERNAL MEDICINE

## 2024-05-10 RX ORDER — GABAPENTIN 400 MG/1
400 CAPSULE ORAL 4 TIMES DAILY
COMMUNITY

## 2024-05-10 RX ORDER — CLONIDINE HYDROCHLORIDE 0.2 MG/1
0.2 TABLET ORAL DAILY
COMMUNITY

## 2024-05-10 RX ORDER — ALBUTEROL SULFATE 90 UG/1
2 AEROSOL, METERED RESPIRATORY (INHALATION) EVERY 6 HOURS PRN
COMMUNITY

## 2024-05-11 LAB
BACTERIA UR CULT: ABNORMAL
ORGANISM: ABNORMAL

## 2024-05-11 NOTE — ED NOTES
IV site left wrist now working well.   IV fluids infusing.  Site clear.    Now giving IV meds as ordered.   Explained all orders.   Son remains at bedside.

## 2024-05-11 NOTE — ED NOTES
Reports feeling ill since 5/7.   Reports SOB, vomiting, lower back pain, and \"peeing blood\".   Walked back to room , gait steady.  Talks in full sentences.  Reports vomiting 4 times today (also had vomiting for 1 day 1 week ago.)   No diarrhea.  Pain lower back, kvng left lower back at 6.   Also reports feeling more SOB than usual for past week.   Holding bottle of juice and bag of snacks.   Encouraged to be NPO  lung sounds diminished with exp wheezes.

## 2025-07-20 ENCOUNTER — OFFICE VISIT (OUTPATIENT)
Age: 40
End: 2025-07-20

## 2025-07-20 VITALS
HEIGHT: 64 IN | BODY MASS INDEX: 42.72 KG/M2 | WEIGHT: 250.2 LBS | HEART RATE: 64 BPM | TEMPERATURE: 98 F | DIASTOLIC BLOOD PRESSURE: 55 MMHG | OXYGEN SATURATION: 94 % | SYSTOLIC BLOOD PRESSURE: 87 MMHG

## 2025-07-20 DIAGNOSIS — K05.219 GUM ABSCESS: Primary | ICD-10-CM

## 2025-07-20 RX ORDER — PENICILLIN V POTASSIUM 500 MG/1
500 TABLET, FILM COATED ORAL 4 TIMES DAILY
Qty: 20 TABLET | Refills: 0 | Status: SHIPPED | OUTPATIENT
Start: 2025-07-20 | End: 2025-07-25

## 2025-07-20 RX ORDER — IBUPROFEN 800 MG/1
800 TABLET, FILM COATED ORAL 2 TIMES DAILY PRN
Qty: 60 TABLET | Refills: 0 | Status: SHIPPED | OUTPATIENT
Start: 2025-07-20